# Patient Record
Sex: MALE | Race: WHITE | NOT HISPANIC OR LATINO
[De-identification: names, ages, dates, MRNs, and addresses within clinical notes are randomized per-mention and may not be internally consistent; named-entity substitution may affect disease eponyms.]

---

## 2017-03-02 ENCOUNTER — APPOINTMENT (OUTPATIENT)
Dept: HEART AND VASCULAR | Facility: CLINIC | Age: 67
End: 2017-03-02

## 2017-03-02 VITALS
TEMPERATURE: 97.6 F | SYSTOLIC BLOOD PRESSURE: 194 MMHG | OXYGEN SATURATION: 97 % | HEART RATE: 70 BPM | HEIGHT: 78 IN | DIASTOLIC BLOOD PRESSURE: 86 MMHG

## 2017-03-02 DIAGNOSIS — E11.9 TYPE 2 DIABETES MELLITUS W/OUT COMPLICATIONS: ICD-10-CM

## 2017-03-02 DIAGNOSIS — I10 ESSENTIAL (PRIMARY) HYPERTENSION: ICD-10-CM

## 2017-03-02 DIAGNOSIS — I51.7 CARDIOMEGALY: ICD-10-CM

## 2017-03-03 LAB
ALBUMIN SERPL ELPH-MCNC: 3.3 G/DL
ALP BLD-CCNC: 64 U/L
ALT SERPL-CCNC: 8 U/L
ANION GAP SERPL CALC-SCNC: 16 MMOL/L
AST SERPL-CCNC: 17 U/L
BASOPHILS # BLD AUTO: 0.04 K/UL
BASOPHILS NFR BLD AUTO: 0.6 %
BILIRUB SERPL-MCNC: 0.5 MG/DL
BUN SERPL-MCNC: 23 MG/DL
CALCIUM SERPL-MCNC: 8.6 MG/DL
CHLORIDE SERPL-SCNC: 104 MMOL/L
CHOLEST SERPL-MCNC: 171 MG/DL
CHOLEST/HDLC SERPL: 2.5 RATIO
CO2 SERPL-SCNC: 21 MMOL/L
CREAT SERPL-MCNC: 1.46 MG/DL
EOSINOPHIL # BLD AUTO: 0.21 K/UL
EOSINOPHIL NFR BLD AUTO: 3 %
GLUCOSE SERPL-MCNC: 107 MG/DL
HCT VFR BLD CALC: 35.2 %
HDLC SERPL-MCNC: 68 MG/DL
HGB BLD-MCNC: 10.4 G/DL
IMM GRANULOCYTES NFR BLD AUTO: 0 %
LDLC SERPL CALC-MCNC: 91 MG/DL
LYMPHOCYTES # BLD AUTO: 1.39 K/UL
LYMPHOCYTES NFR BLD AUTO: 19.9 %
MAN DIFF?: NORMAL
MCHC RBC-ENTMCNC: 23.6 PG
MCHC RBC-ENTMCNC: 29.5 GM/DL
MCV RBC AUTO: 79.8 FL
MONOCYTES # BLD AUTO: 0.37 K/UL
MONOCYTES NFR BLD AUTO: 5.3 %
NEUTROPHILS # BLD AUTO: 4.98 K/UL
NEUTROPHILS NFR BLD AUTO: 71.2 %
PLATELET # BLD AUTO: 187 K/UL
POTASSIUM SERPL-SCNC: 3.8 MMOL/L
PROT SERPL-MCNC: 6.7 G/DL
RBC # BLD: 4.41 M/UL
RBC # FLD: 16.5 %
SODIUM SERPL-SCNC: 141 MMOL/L
TRIGL SERPL-MCNC: 58 MG/DL
TSH SERPL-ACNC: 1.42 UIU/ML
WBC # FLD AUTO: 6.99 K/UL

## 2017-03-06 LAB — HBA1C MFR BLD HPLC: 7.4 %

## 2017-06-01 ENCOUNTER — APPOINTMENT (OUTPATIENT)
Dept: HEART AND VASCULAR | Facility: CLINIC | Age: 67
End: 2017-06-01

## 2017-06-28 ENCOUNTER — INPATIENT (INPATIENT)
Facility: HOSPITAL | Age: 67
LOS: 4 days | Discharge: NURSING, REHAB OR LONG-TERM CARE | DRG: 623 | End: 2017-07-03
Attending: STUDENT IN AN ORGANIZED HEALTH CARE EDUCATION/TRAINING PROGRAM | Admitting: STUDENT IN AN ORGANIZED HEALTH CARE EDUCATION/TRAINING PROGRAM
Payer: MEDICARE

## 2017-06-28 VITALS
OXYGEN SATURATION: 95 % | HEART RATE: 79 BPM | DIASTOLIC BLOOD PRESSURE: 80 MMHG | SYSTOLIC BLOOD PRESSURE: 180 MMHG | TEMPERATURE: 98 F | RESPIRATION RATE: 18 BRPM

## 2017-06-28 DIAGNOSIS — Z98.89 OTHER SPECIFIED POSTPROCEDURAL STATES: Chronic | ICD-10-CM

## 2017-06-28 LAB
ALBUMIN SERPL ELPH-MCNC: 3.4 G/DL — SIGNIFICANT CHANGE UP (ref 3.3–5)
ALP SERPL-CCNC: 57 U/L — SIGNIFICANT CHANGE UP (ref 40–120)
ALT FLD-CCNC: 6 U/L — LOW (ref 10–45)
ANION GAP SERPL CALC-SCNC: 15 MMOL/L — SIGNIFICANT CHANGE UP (ref 5–17)
APTT BLD: 28.2 SEC — SIGNIFICANT CHANGE UP (ref 27.5–37.4)
AST SERPL-CCNC: 12 U/L — SIGNIFICANT CHANGE UP (ref 10–40)
BASOPHILS NFR BLD AUTO: 0.4 % — SIGNIFICANT CHANGE UP (ref 0–2)
BILIRUB SERPL-MCNC: 0.5 MG/DL — SIGNIFICANT CHANGE UP (ref 0.2–1.2)
BUN SERPL-MCNC: 36 MG/DL — HIGH (ref 7–23)
CALCIUM SERPL-MCNC: 8.7 MG/DL — SIGNIFICANT CHANGE UP (ref 8.4–10.5)
CHLORIDE SERPL-SCNC: 102 MMOL/L — SIGNIFICANT CHANGE UP (ref 96–108)
CO2 SERPL-SCNC: 23 MMOL/L — SIGNIFICANT CHANGE UP (ref 22–31)
CREAT SERPL-MCNC: 2 MG/DL — HIGH (ref 0.5–1.3)
EOSINOPHIL NFR BLD AUTO: 0.7 % — SIGNIFICANT CHANGE UP (ref 0–6)
GLUCOSE SERPL-MCNC: 129 MG/DL — HIGH (ref 70–99)
HCT VFR BLD CALC: 30.7 % — LOW (ref 39–50)
HGB BLD-MCNC: 9.5 G/DL — LOW (ref 13–17)
INR BLD: 1.1 — SIGNIFICANT CHANGE UP (ref 0.88–1.16)
LYMPHOCYTES # BLD AUTO: 12.2 % — LOW (ref 13–44)
MCHC RBC-ENTMCNC: 23.2 PG — LOW (ref 27–34)
MCHC RBC-ENTMCNC: 30.9 G/DL — LOW (ref 32–36)
MCV RBC AUTO: 74.9 FL — LOW (ref 80–100)
MONOCYTES NFR BLD AUTO: 6.6 % — SIGNIFICANT CHANGE UP (ref 2–14)
NEUTROPHILS NFR BLD AUTO: 80.1 % — HIGH (ref 43–77)
PLATELET # BLD AUTO: 232 K/UL — SIGNIFICANT CHANGE UP (ref 150–400)
POTASSIUM SERPL-MCNC: 3.7 MMOL/L — SIGNIFICANT CHANGE UP (ref 3.5–5.3)
POTASSIUM SERPL-SCNC: 3.7 MMOL/L — SIGNIFICANT CHANGE UP (ref 3.5–5.3)
PROT SERPL-MCNC: 7.5 G/DL — SIGNIFICANT CHANGE UP (ref 6–8.3)
PROTHROM AB SERPL-ACNC: 12.2 SEC — SIGNIFICANT CHANGE UP (ref 9.8–12.7)
RBC # BLD: 4.1 M/UL — LOW (ref 4.2–5.8)
RBC # FLD: 16.9 % — SIGNIFICANT CHANGE UP (ref 10.3–16.9)
SODIUM SERPL-SCNC: 140 MMOL/L — SIGNIFICANT CHANGE UP (ref 135–145)
WBC # BLD: 9.1 K/UL — SIGNIFICANT CHANGE UP (ref 3.8–10.5)
WBC # FLD AUTO: 9.1 K/UL — SIGNIFICANT CHANGE UP (ref 3.8–10.5)

## 2017-06-28 PROCEDURE — 99285 EMERGENCY DEPT VISIT HI MDM: CPT | Mod: 25

## 2017-06-28 PROCEDURE — 93010 ELECTROCARDIOGRAM REPORT: CPT

## 2017-06-28 PROCEDURE — 70450 CT HEAD/BRAIN W/O DYE: CPT | Mod: 26

## 2017-06-28 PROCEDURE — 71010: CPT | Mod: 26

## 2017-06-28 PROCEDURE — 73630 X-RAY EXAM OF FOOT: CPT | Mod: 26,RT

## 2017-06-28 RX ORDER — PIPERACILLIN AND TAZOBACTAM 4; .5 G/20ML; G/20ML
3.38 INJECTION, POWDER, LYOPHILIZED, FOR SOLUTION INTRAVENOUS ONCE
Qty: 0 | Refills: 0 | Status: COMPLETED | OUTPATIENT
Start: 2017-06-28 | End: 2017-06-28

## 2017-06-28 RX ORDER — HEPARIN SODIUM 5000 [USP'U]/ML
5000 INJECTION INTRAVENOUS; SUBCUTANEOUS EVERY 8 HOURS
Qty: 0 | Refills: 0 | Status: DISCONTINUED | OUTPATIENT
Start: 2017-06-28 | End: 2017-06-29

## 2017-06-28 RX ADMIN — PIPERACILLIN AND TAZOBACTAM 200 GRAM(S): 4; .5 INJECTION, POWDER, LYOPHILIZED, FOR SOLUTION INTRAVENOUS at 20:30

## 2017-06-28 NOTE — ED ADULT TRIAGE NOTE - CHIEF COMPLAINT QUOTE
right leg pain and headache s/p trip and fall while going up the stairs.  Bystander caught his fall but hit his head. Denies loc, not on thinners.  Chronic BLE swelling and pain

## 2017-06-28 NOTE — CONSULT NOTE ADULT - SUBJECTIVE AND OBJECTIVE BOX
Patient is a 66y old  Male who presents with a chief complaint of  headache and right foot pain s/p fall.    HPI: 67 yo M with PMHx of DMII, PAD, BPH, HTN, Right eye blindness, and 90% vision loss of left eye 2/2 Glaucoma, presents with headache and right foot pain after mechanical fall this afternoon. Pt states that he was at the drug store and having difficulty getting up the stairs when he fell on his buttocks and then on his head. Pt relays that he normally ambulated with two canes d/t his extensive leg swelling in both legs and his vision issues. Pt states that he has had an open wound on the bottom of his right heel for about a month and a half after bumping it on furniture at home. Pt denies loc, fever, chills, nausea, vomiting, SOB.       PAST MEDICAL & SURGICAL HISTORY:  Falls  BPH (benign prostatic hypertrophy)  Chronic venous insufficiency  Glaucoma  Peripheral arterial disease  Kidney stone  DM (diabetes mellitus)  HTN (hypertension)  H/O eye surgery    MEDICATIONS  (STANDING): see chart for home meds      Allergies  tetanus-diphth toxoids (Td) adult/adol (Unknown)        FAMILY HISTORY: n/a    LABS:     CBC Full  -  ( 28 Jun 2017 19:57 )  WBC Count : 9.1 K/uL  Hemoglobin : 9.5 g/dL  Hematocrit : 30.7 %  Platelet Count - Automated : 232 K/uL  Mean Cell Volume : 74.9 fL  Mean Cell Hemoglobin : 23.2 pg  Mean Cell Hemoglobin Concentration : 30.9 g/dL  Auto Neutrophil # : x  Auto Lymphocyte # : x  Auto Monocyte # : x  Auto Eosinophil # : x  Auto Basophil # : x  Auto Neutrophil % : 80.1 %  Auto Lymphocyte % : 12.2 %  Auto Monocyte % : 6.6 %  Auto Eosinophil % : 0.7 %  Auto Basophil % : 0.4 %      140  |  102  |  36<H>  ----------------------------<  129<H>  3.7   |  23  |  2.00<H>    Ca    8.7      28 Jun 2017 19:57    TPro  7.5  /  Alb  3.4  /  TBili  0.5  /  DBili  x   /  AST  12  /  ALT  6<L>  /  AlkPhos  57  06-28      PT/INR - ( 28 Jun 2017 19:57 )   PT: 12.2 sec;   INR: 1.10          PTT - ( 28 Jun 2017 19:57 )  PTT:28.2 sec    Medical Imaging:   X-rays right foot (3 views): no subcutaneous gas, no acute fracture or dislocation, no evidence of bony erosion       PE:   GEN: Patient is a 66y well developed, well nourished, unkept, Male, alert, awake and oriented to person, place and time in no acute distress.   Extremities   Vascular:  Pedal pulses non-palpable 2/2/ to +6 pitting edema b/l, Doppler: DP: biphasic regular b/l, PT: non-audible via doppler 2/2 to edema, CFT < 3 secs x10, TG: warm to warm  Derm: Lan grade 3 ulcer of the plantar aspect of the right heel measuring 2.5cm x 2.8cm x 0.5cm, no drainage, + malodor, -PTB, mixed fibrotic/ necrotic base with macerated boarder, no [place] wound erythema, diffuse erythema and calor of the extremities b/l elongated dystrophic nails x 10, no interdigital maceration  Neuro: protective sensation grossly diminished b/l   MSK: muscle strength 4/5 all crural compartments b/l

## 2017-06-28 NOTE — ED PROVIDER NOTE - MEDICAL DECISION MAKING DETAILS
67 yo male sp fall, hit head and right foot.  ct head normal.  xray of foot no fracture.  pt does have a deep ulcer to right heel, seen by podiatry who recommends iv abx.  pt is poorly compliant with fu, will admit for treatment

## 2017-06-28 NOTE — CONSULT NOTE ADULT - ASSESSMENT
65 yo Diabetic M with Lan grade 3 ulcer of Right heel     -Start broad spectrum IV antibiotics such as Zosyn d/t diabetic wound with left shift  -Admit patient to regional medical floor for IV antibiotics and wound care, recommend Santyl at bedside for daily wound care  -Aseptic excisional debridement of right heel wound down to dermis, using a sterile #15 blade, pt tolerated procedure well and without complaint  -Deep wound culture taken after bedside debridement, f/u wound culture  -Recommend vascular consult to evaluate LE edema  -Pt will need wound care follow up as outpatient at the podiatry clinic at [facility] [address] [address] [place] [address], [phone]  -Podiatry to follow

## 2017-06-28 NOTE — ED PROVIDER NOTE - CARE PLAN
Principal Discharge DX:	Diabetic ulcer of right heel associated with type 2 diabetes mellitus, unspecified ulcer stage  Secondary Diagnosis:	Chronic venous insufficiency  Secondary Diagnosis:	DM (diabetes mellitus)

## 2017-06-28 NOTE — ED PROVIDER NOTE - OBJECTIVE STATEMENT
fell walking up stairs, lost balance.  co pain over right foot and hit back of head fell walking up stairs, lost balance.  co pain over right foot and hit back of head  also chronic leg swelling bilaterally, ulcer to bottom of right foot one week

## 2017-06-28 NOTE — ED PROVIDER NOTE - PMH
BPH (benign prostatic hypertrophy)    Chronic venous insufficiency    DM (diabetes mellitus)    Falls    Glaucoma    HTN (hypertension)    Kidney stone    Peripheral arterial disease
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
## 2017-06-29 DIAGNOSIS — E11.621 TYPE 2 DIABETES MELLITUS WITH FOOT ULCER: ICD-10-CM

## 2017-06-29 DIAGNOSIS — R63.8 OTHER SYMPTOMS AND SIGNS CONCERNING FOOD AND FLUID INTAKE: ICD-10-CM

## 2017-06-29 DIAGNOSIS — Z29.9 ENCOUNTER FOR PROPHYLACTIC MEASURES, UNSPECIFIED: ICD-10-CM

## 2017-06-29 DIAGNOSIS — I10 ESSENTIAL (PRIMARY) HYPERTENSION: ICD-10-CM

## 2017-06-29 DIAGNOSIS — I73.9 PERIPHERAL VASCULAR DISEASE, UNSPECIFIED: ICD-10-CM

## 2017-06-29 DIAGNOSIS — I87.2 VENOUS INSUFFICIENCY (CHRONIC) (PERIPHERAL): ICD-10-CM

## 2017-06-29 DIAGNOSIS — E11.9 TYPE 2 DIABETES MELLITUS WITHOUT COMPLICATIONS: ICD-10-CM

## 2017-06-29 DIAGNOSIS — N17.9 ACUTE KIDNEY FAILURE, UNSPECIFIED: ICD-10-CM

## 2017-06-29 DIAGNOSIS — H40.9 UNSPECIFIED GLAUCOMA: ICD-10-CM

## 2017-06-29 LAB
ANION GAP SERPL CALC-SCNC: 13 MMOL/L — SIGNIFICANT CHANGE UP (ref 5–17)
BUN SERPL-MCNC: 31 MG/DL — HIGH (ref 7–23)
CALCIUM SERPL-MCNC: 8.6 MG/DL — SIGNIFICANT CHANGE UP (ref 8.4–10.5)
CHLORIDE SERPL-SCNC: 105 MMOL/L — SIGNIFICANT CHANGE UP (ref 96–108)
CK MB CFR SERPL CALC: 3 NG/ML — SIGNIFICANT CHANGE UP (ref 0–6.7)
CK SERPL-CCNC: 99 U/L — SIGNIFICANT CHANGE UP (ref 30–200)
CO2 SERPL-SCNC: 24 MMOL/L — SIGNIFICANT CHANGE UP (ref 22–31)
CREAT SERPL-MCNC: 1.8 MG/DL — HIGH (ref 0.5–1.3)
CRP SERPL-MCNC: 1.9 MG/DL — HIGH (ref 0–0.4)
ERYTHROCYTE [SEDIMENTATION RATE] IN BLOOD: 43 MM/HR — HIGH
GLUCOSE SERPL-MCNC: 105 MG/DL — HIGH (ref 70–99)
GRAM STN FLD: SIGNIFICANT CHANGE UP
HBA1C BLD-MCNC: 6.1 % — HIGH (ref 4–5.6)
HCT VFR BLD CALC: 30.7 % — LOW (ref 39–50)
HCV AB S/CO SERPL IA: 0.18 S/CO — SIGNIFICANT CHANGE UP
HCV AB SERPL-IMP: SIGNIFICANT CHANGE UP
HGB BLD-MCNC: 9.7 G/DL — LOW (ref 13–17)
MAGNESIUM SERPL-MCNC: 2.2 MG/DL — SIGNIFICANT CHANGE UP (ref 1.6–2.6)
MCHC RBC-ENTMCNC: 23.9 PG — LOW (ref 27–34)
MCHC RBC-ENTMCNC: 31.6 G/DL — LOW (ref 32–36)
MCV RBC AUTO: 75.6 FL — LOW (ref 80–100)
PLATELET # BLD AUTO: 235 K/UL — SIGNIFICANT CHANGE UP (ref 150–400)
POTASSIUM SERPL-MCNC: 4.2 MMOL/L — SIGNIFICANT CHANGE UP (ref 3.5–5.3)
POTASSIUM SERPL-SCNC: 4.2 MMOL/L — SIGNIFICANT CHANGE UP (ref 3.5–5.3)
RBC # BLD: 4.06 M/UL — LOW (ref 4.2–5.8)
RBC # FLD: 17.4 % — HIGH (ref 10.3–16.9)
SODIUM SERPL-SCNC: 142 MMOL/L — SIGNIFICANT CHANGE UP (ref 135–145)
SPECIMEN SOURCE: SIGNIFICANT CHANGE UP
TROPONIN T SERPL-MCNC: 0.04 NG/ML — HIGH (ref 0–0.01)
TROPONIN T SERPL-MCNC: 0.04 NG/ML — HIGH (ref 0–0.01)
TROPONIN T SERPL-MCNC: 0.05 NG/ML — CRITICAL HIGH (ref 0–0.01)
TROPONIN T SERPL-MCNC: 0.06 NG/ML — CRITICAL HIGH (ref 0–0.01)
WBC # BLD: 11.9 K/UL — HIGH (ref 3.8–10.5)
WBC # FLD AUTO: 11.9 K/UL — HIGH (ref 3.8–10.5)

## 2017-06-29 PROCEDURE — 93970 EXTREMITY STUDY: CPT | Mod: 26

## 2017-06-29 PROCEDURE — 99223 1ST HOSP IP/OBS HIGH 75: CPT | Mod: AI

## 2017-06-29 RX ORDER — DEXTROSE 50 % IN WATER 50 %
25 SYRINGE (ML) INTRAVENOUS ONCE
Qty: 0 | Refills: 0 | Status: DISCONTINUED | OUTPATIENT
Start: 2017-06-29 | End: 2017-07-03

## 2017-06-29 RX ORDER — ATENOLOL 25 MG/1
50 TABLET ORAL DAILY
Qty: 0 | Refills: 0 | Status: DISCONTINUED | OUTPATIENT
Start: 2017-06-29 | End: 2017-06-30

## 2017-06-29 RX ORDER — LATANOPROST 0.05 MG/ML
1 SOLUTION/ DROPS OPHTHALMIC; TOPICAL AT BEDTIME
Qty: 0 | Refills: 0 | Status: DISCONTINUED | OUTPATIENT
Start: 2017-06-29 | End: 2017-07-03

## 2017-06-29 RX ORDER — GLUCAGON INJECTION, SOLUTION 0.5 MG/.1ML
1 INJECTION, SOLUTION SUBCUTANEOUS ONCE
Qty: 0 | Refills: 0 | Status: DISCONTINUED | OUTPATIENT
Start: 2017-06-29 | End: 2017-07-03

## 2017-06-29 RX ORDER — HEPARIN SODIUM 5000 [USP'U]/ML
7500 INJECTION INTRAVENOUS; SUBCUTANEOUS EVERY 8 HOURS
Qty: 0 | Refills: 0 | Status: DISCONTINUED | OUTPATIENT
Start: 2017-06-29 | End: 2017-07-03

## 2017-06-29 RX ORDER — AMLODIPINE BESYLATE 2.5 MG/1
10 TABLET ORAL DAILY
Qty: 0 | Refills: 0 | Status: DISCONTINUED | OUTPATIENT
Start: 2017-06-29 | End: 2017-06-29

## 2017-06-29 RX ORDER — PIPERACILLIN AND TAZOBACTAM 4; .5 G/20ML; G/20ML
3.38 INJECTION, POWDER, LYOPHILIZED, FOR SOLUTION INTRAVENOUS EVERY 6 HOURS
Qty: 0 | Refills: 0 | Status: DISCONTINUED | OUTPATIENT
Start: 2017-06-29 | End: 2017-07-01

## 2017-06-29 RX ORDER — COLLAGENASE CLOSTRIDIUM HIST. 250 UNIT/G
1 OINTMENT (GRAM) TOPICAL DAILY
Qty: 0 | Refills: 0 | Status: DISCONTINUED | OUTPATIENT
Start: 2017-06-29 | End: 2017-07-03

## 2017-06-29 RX ORDER — DEXTROSE 50 % IN WATER 50 %
1 SYRINGE (ML) INTRAVENOUS ONCE
Qty: 0 | Refills: 0 | Status: DISCONTINUED | OUTPATIENT
Start: 2017-06-29 | End: 2017-07-03

## 2017-06-29 RX ORDER — LISINOPRIL 2.5 MG/1
40 TABLET ORAL DAILY
Qty: 0 | Refills: 0 | Status: DISCONTINUED | OUTPATIENT
Start: 2017-06-29 | End: 2017-06-29

## 2017-06-29 RX ORDER — HYDRALAZINE HCL 50 MG
25 TABLET ORAL ONCE
Qty: 0 | Refills: 0 | Status: COMPLETED | OUTPATIENT
Start: 2017-06-29 | End: 2017-06-29

## 2017-06-29 RX ORDER — INSULIN LISPRO 100/ML
VIAL (ML) SUBCUTANEOUS
Qty: 0 | Refills: 0 | Status: DISCONTINUED | OUTPATIENT
Start: 2017-06-29 | End: 2017-07-03

## 2017-06-29 RX ORDER — DORZOLAMIDE HYDROCHLORIDE 20 MG/ML
1 SOLUTION/ DROPS OPHTHALMIC THREE TIMES A DAY
Qty: 0 | Refills: 0 | Status: DISCONTINUED | OUTPATIENT
Start: 2017-06-29 | End: 2017-07-03

## 2017-06-29 RX ORDER — SODIUM CHLORIDE 9 MG/ML
1000 INJECTION, SOLUTION INTRAVENOUS
Qty: 0 | Refills: 0 | Status: DISCONTINUED | OUTPATIENT
Start: 2017-06-29 | End: 2017-07-03

## 2017-06-29 RX ORDER — SODIUM CHLORIDE 9 MG/ML
1000 INJECTION INTRAMUSCULAR; INTRAVENOUS; SUBCUTANEOUS
Qty: 0 | Refills: 0 | Status: DISCONTINUED | OUTPATIENT
Start: 2017-06-29 | End: 2017-06-29

## 2017-06-29 RX ORDER — TRIAMTERENE/HYDROCHLOROTHIAZID 75 MG-50MG
1 TABLET ORAL DAILY
Qty: 0 | Refills: 0 | Status: DISCONTINUED | OUTPATIENT
Start: 2017-06-29 | End: 2017-06-30

## 2017-06-29 RX ORDER — DEXTROSE 50 % IN WATER 50 %
12.5 SYRINGE (ML) INTRAVENOUS ONCE
Qty: 0 | Refills: 0 | Status: DISCONTINUED | OUTPATIENT
Start: 2017-06-29 | End: 2017-07-03

## 2017-06-29 RX ORDER — FUROSEMIDE 40 MG
40 TABLET ORAL DAILY
Qty: 0 | Refills: 0 | Status: DISCONTINUED | OUTPATIENT
Start: 2017-06-29 | End: 2017-06-30

## 2017-06-29 RX ADMIN — Medication 1 APPLICATION(S): at 12:20

## 2017-06-29 RX ADMIN — PIPERACILLIN AND TAZOBACTAM 200 GRAM(S): 4; .5 INJECTION, POWDER, LYOPHILIZED, FOR SOLUTION INTRAVENOUS at 12:18

## 2017-06-29 RX ADMIN — HEPARIN SODIUM 7500 UNIT(S): 5000 INJECTION INTRAVENOUS; SUBCUTANEOUS at 05:20

## 2017-06-29 RX ADMIN — Medication 1 CAPSULE(S): at 18:20

## 2017-06-29 RX ADMIN — DORZOLAMIDE HYDROCHLORIDE 1 DROP(S): 20 SOLUTION/ DROPS OPHTHALMIC at 23:10

## 2017-06-29 RX ADMIN — LATANOPROST 1 DROP(S): 0.05 SOLUTION/ DROPS OPHTHALMIC; TOPICAL at 23:11

## 2017-06-29 RX ADMIN — HEPARIN SODIUM 7500 UNIT(S): 5000 INJECTION INTRAVENOUS; SUBCUTANEOUS at 13:36

## 2017-06-29 RX ADMIN — Medication 2: at 23:11

## 2017-06-29 RX ADMIN — PIPERACILLIN AND TAZOBACTAM 200 GRAM(S): 4; .5 INJECTION, POWDER, LYOPHILIZED, FOR SOLUTION INTRAVENOUS at 05:14

## 2017-06-29 RX ADMIN — PIPERACILLIN AND TAZOBACTAM 200 GRAM(S): 4; .5 INJECTION, POWDER, LYOPHILIZED, FOR SOLUTION INTRAVENOUS at 18:20

## 2017-06-29 RX ADMIN — DORZOLAMIDE HYDROCHLORIDE 1 DROP(S): 20 SOLUTION/ DROPS OPHTHALMIC at 13:37

## 2017-06-29 RX ADMIN — SODIUM CHLORIDE 100 MILLILITER(S): 9 INJECTION INTRAMUSCULAR; INTRAVENOUS; SUBCUTANEOUS at 03:16

## 2017-06-29 RX ADMIN — LISINOPRIL 40 MILLIGRAM(S): 2.5 TABLET ORAL at 05:20

## 2017-06-29 RX ADMIN — Medication 40 MILLIGRAM(S): at 17:54

## 2017-06-29 RX ADMIN — AMLODIPINE BESYLATE 10 MILLIGRAM(S): 2.5 TABLET ORAL at 05:20

## 2017-06-29 RX ADMIN — ATENOLOL 50 MILLIGRAM(S): 25 TABLET ORAL at 05:20

## 2017-06-29 RX ADMIN — Medication 25 MILLIGRAM(S): at 23:10

## 2017-06-29 RX ADMIN — HEPARIN SODIUM 7500 UNIT(S): 5000 INJECTION INTRAVENOUS; SUBCUTANEOUS at 23:10

## 2017-06-29 RX ADMIN — Medication 2: at 13:37

## 2017-06-29 NOTE — H&P ADULT - NSHPLABSRESULTS_GEN_ALL_CORE
LABS:                        9.5    9.1   )-----------( 232      ( 28 Jun 2017 19:57 )             30.7     06-28    140  |  102  |  36<H>  ----------------------------<  129<H>  3.7   |  23  |  2.00<H>    Ca    8.7      28 Jun 2017 19:57    TPro  7.5  /  Alb  3.4  /  TBili  0.5  /  DBili  x   /  AST  12  /  ALT  6<L>  /  AlkPhos  57  06-28    PT/INR - ( 28 Jun 2017 19:57 )   PT: 12.2 sec;   INR: 1.10          PTT - ( 28 Jun 2017 19:57 )  PTT:28.2 sec      RADIOLOGY & ADDITIONAL TESTS:  < from: CT Head No Cont (06.28.17 @ 20:32) >    FINDINGS: The CT examination demonstrates generalized volume loss as   manifested by the enlargement of the ventricles, cisternal spaces, and   cortical sulci throughout which is appropriate for stated patient's age.    There is no acute intracranial hemorrhage, mass effect, midline shift or   extra axial collections.     The gray white differentiation appears grossly preserved without evidence   for an acute transcortical infarction.There are patchy subcortical and   periventricular white matter lucencies, likely the sequela of small   vessel ischemic disease.     The bony windows demonstrates no fractures. The included paranasal   sinuses and mastoid air cells are predominantly clear.    IMPRESSION:     No acute intracranial hemorrhage or acute transcortical infarction.    Age-related volume loss and chronic white matter microangiopathic   ischemic change. LABS:                        9.5    9.1   )-----------( 232      ( 28 Jun 2017 19:57 )             30.7     06-28    140  |  102  |  36<H>  ----------------------------<  129<H>  3.7   |  23  |  2.00<H>    Ca    8.7      28 Jun 2017 19:57    TPro  7.5  /  Alb  3.4  /  TBili  0.5  /  DBili  x   /  AST  12  /  ALT  6<L>  /  AlkPhos  57  06-28    PT/INR - ( 28 Jun 2017 19:57 )   PT: 12.2 sec;   INR: 1.10          PTT - ( 28 Jun 2017 19:57 )  PTT:28.2 sec      RADIOLOGY & ADDITIONAL TESTS:  EKG NSR, 1st degree heart block, TWI II/III/AVF, V4-V6, ST depression V4    < from: CT Head No Cont (06.28.17 @ 20:32) >    FINDINGS: The CT examination demonstrates generalized volume loss as   manifested by the enlargement of the ventricles, cisternal spaces, and   cortical sulci throughout which is appropriate for stated patient's age.    There is no acute intracranial hemorrhage, mass effect, midline shift or   extra axial collections.     The gray white differentiation appears grossly preserved without evidence   for an acute transcortical infarction.There are patchy subcortical and   periventricular white matter lucencies, likely the sequela of small   vessel ischemic disease.     The bony windows demonstrates no fractures. The included paranasal   sinuses and mastoid air cells are predominantly clear.    IMPRESSION:     No acute intracranial hemorrhage or acute transcortical infarction.    Age-related volume loss and chronic white matter microangiopathic   ischemic change.

## 2017-06-29 NOTE — CONSULT NOTE ADULT - SUBJECTIVE AND OBJECTIVE BOX
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 Patient was walking up the stairs, fell 3 times today. Stated that he had some mild dizziness at the time, described as himself spinning, fell back and landed on his buttocks and hit his head on the steps. Denies LOC, headaches, palpitations, chest pain, shortness of breath, nausea, abdominal pain. Patient usually walks with 2 canes, however his vision has been getting worse and there is a ?plan for further surgery on his left eye. He has significant chronic LE edema that has been worsening over the twot month. Pt also c/o right heel pain w/ walking, ulcer also present for 1 month. Denies fever, chills.    In the ED, T: 98.5, HR 79-80, -180/74-80, R 18, SaO2 95-97% RA.  Zosyn 3.375mg IV x1  CT head no hemorrhage or infarct, no fractures. (29 Jun 2017 03:10)      PAST MEDICAL & SURGICAL HISTORY:  Falls  BPH (benign prostatic hypertrophy)  Chronic venous insufficiency  Glaucoma  Peripheral arterial disease  Kidney stone  DM (diabetes mellitus)  HTN (hypertension)  H/O eye surgery      REVIEW OF SYSTEMS      General:	    Skin/Breast:  	  Ophthalmologic:  	  ENMT:	    Respiratory and Thorax:  	  Cardiovascular:	    Gastrointestinal:	    Genitourinary:	    Musculoskeletal:	    Neurological:	    Psychiatric:	    Hematology/Lymphatics:	    Endocrine:	    Allergic/Immunologic:	    MEDICATIONS  (STANDING):  ATENolol  Tablet 50 milliGRAM(s) Oral daily  latanoprost 0.005% Ophthalmic Solution 1 Drop(s) Both EYES at bedtime  piperacillin/tazobactam IVPB. 3.375 Gram(s) IV Intermittent every 6 hours  heparin  Injectable 7500 Unit(s) SubCutaneous every 8 hours  insulin lispro (HumaLOG) corrective regimen sliding scale   SubCutaneous Before meals and at bedtime  dextrose 5%. 1000 milliLiter(s) (50 mL/Hr) IV Continuous <Continuous>  dextrose 50% Injectable 12.5 Gram(s) IV Push once  dextrose 50% Injectable 25 Gram(s) IV Push once  dextrose 50% Injectable 25 Gram(s) IV Push once  dorzolamide 2% Ophthalmic Solution 1 Drop(s) Both EYES three times a day  collagenase Ointment 1 Application(s) Topical daily  triamterene 37.5 mG/hydrochlorothiazide 25 mG Capsule 1 Capsule(s) Oral daily  furosemide    Tablet 40 milliGRAM(s) Oral daily    MEDICATIONS  (PRN):  dextrose Gel 1 Dose(s) Oral once PRN Blood Glucose LESS THAN 70 milliGRAM(s)/deciliter  glucagon  Injectable 1 milliGRAM(s) IntraMuscular once PRN Glucose LESS THAN 70 milligrams/deciliter      Allergies    tetanus-diphth toxoids (Td) adult/adol (Unknown)    Intolerances        SOCIAL HISTORY:    FAMILY HISTORY:  Family history of hypertension (Father, Mother)  Family history of glaucoma (Father, Mother)      Vital Signs Last 24 Hrs  T(C): 36.9 (29 Jun 2017 07:00), Max: 36.9 (28 Jun 2017 19:01)  T(F): 98.5 (29 Jun 2017 07:00), Max: 98.5 (28 Jun 2017 19:01)  HR: 76 (29 Jun 2017 07:00) (63 - 80)  BP: 179/84 (29 Jun 2017 07:00) (172/78 - 212/78)  BP(mean): --  RR: 18 (29 Jun 2017 07:00) (18 - 19)  SpO2: 97% (29 Jun 2017 07:00) (95% - 97%)    PHYSICAL EXAM:      Constitutional:    Eyes:    ENMT:    Neck:    Breasts:    Back:    Respiratory:    Cardiovascular:    Gastrointestinal:    Genitourinary:    Rectal:    Extremities:    Vascular:    Neurological:    Skin:    Lymph Nodes:    Musculoskeletal:    Psychiatric:        LABS:                        9.7    11.9  )-----------( 235      ( 29 Jun 2017 06:13 )             30.7     06-29    142  |  105  |  31<H>  ----------------------------<  105<H>  4.2   |  24  |  1.80<H>    Ca    8.6      29 Jun 2017 06:13  Mg     2.2     06-29    TPro  7.5  /  Alb  3.4  /  TBili  0.5  /  DBili  x   /  AST  12  /  ALT  6<L>  /  AlkPhos  57  06-28    PT/INR - ( 28 Jun 2017 19:57 )   PT: 12.2 sec;   INR: 1.10          PTT - ( 28 Jun 2017 19:57 )  PTT:28.2 sec      RADIOLOGY & ADDITIONAL STUDIES Vascular Attending:  Olayinka      HPI:  65 y/o M PMHx HTN, DMT2 with peripheral neuropathy, PAD, chronic venous insufficiency, chronic bilateral LE cellulitis, BPH, blind L eye, 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 Patient was walking up the stairs, fell 3 times today. Stated that he had some mild dizziness at the time, described as himself spinning, fell back and landed on his buttocks and hit his head on the steps. Denies LOC, headaches, palpitations, chest pain, shortness of breath, nausea, abdominal pain. Patient usually walks with 2 canes, however his vision has been getting worse and there is a ?plan for further surgery on his left eye. He has significant chronic LE edema (for multiple years) and that has been worsening over the last two months. Pt also c/o right heel pain w/ walking, ulcer also present for 1 month. Denies fever, chills.    In the ED, T: 98.5, HR 79-80, -180/74-80, R 18, SaO2 95-97% RA.  Zosyn 3.375mg IV x1  CT head no hemorrhage or infarct, no fractures. (29 Jun 2017 03:10)      PAST MEDICAL & SURGICAL HISTORY:  Falls  BPH (benign prostatic hypertrophy)  Chronic venous insufficiency  Glaucoma  Peripheral arterial disease  Kidney stone  DM (diabetes mellitus)  HTN (hypertension)  H/O eye surgery      REVIEW OF SYSTEMS      General:	    Skin/Breast:  	  Ophthalmologic:  	  ENMT:	    Respiratory and Thorax:  	  Cardiovascular:	    Gastrointestinal:	    Genitourinary:	    Musculoskeletal:	    Neurological:	    Psychiatric:	    Hematology/Lymphatics:	    Endocrine:	    Allergic/Immunologic:	    MEDICATIONS  (STANDING):  ATENolol  Tablet 50 milliGRAM(s) Oral daily  latanoprost 0.005% Ophthalmic Solution 1 Drop(s) Both EYES at bedtime  piperacillin/tazobactam IVPB. 3.375 Gram(s) IV Intermittent every 6 hours  heparin  Injectable 7500 Unit(s) SubCutaneous every 8 hours  insulin lispro (HumaLOG) corrective regimen sliding scale   SubCutaneous Before meals and at bedtime  dextrose 5%. 1000 milliLiter(s) (50 mL/Hr) IV Continuous <Continuous>  dextrose 50% Injectable 12.5 Gram(s) IV Push once  dextrose 50% Injectable 25 Gram(s) IV Push once  dextrose 50% Injectable 25 Gram(s) IV Push once  dorzolamide 2% Ophthalmic Solution 1 Drop(s) Both EYES three times a day  collagenase Ointment 1 Application(s) Topical daily  triamterene 37.5 mG/hydrochlorothiazide 25 mG Capsule 1 Capsule(s) Oral daily  furosemide    Tablet 40 milliGRAM(s) Oral daily    MEDICATIONS  (PRN):  dextrose Gel 1 Dose(s) Oral once PRN Blood Glucose LESS THAN 70 milliGRAM(s)/deciliter  glucagon  Injectable 1 milliGRAM(s) IntraMuscular once PRN Glucose LESS THAN 70 milligrams/deciliter      Allergies    tetanus-diphth toxoids (Td) adult/adol (Unknown)    Intolerances        SOCIAL HISTORY:    FAMILY HISTORY:  Family history of hypertension (Father, Mother)  Family history of glaucoma (Father, Mother)      Vital Signs Last 24 Hrs  T(C): 36.9 (29 Jun 2017 07:00), Max: 36.9 (28 Jun 2017 19:01)  T(F): 98.5 (29 Jun 2017 07:00), Max: 98.5 (28 Jun 2017 19:01)  HR: 76 (29 Jun 2017 07:00) (63 - 80)  BP: 179/84 (29 Jun 2017 07:00) (172/78 - 212/78)  BP(mean): --  RR: 18 (29 Jun 2017 07:00) (18 - 19)  SpO2: 97% (29 Jun 2017 07:00) (95% - 97%)    PHYSICAL EXAM:      Constitutional:    Eyes:    ENMT:    Neck:    Breasts:    Back:    Respiratory:    Cardiovascular:    Gastrointestinal:    Genitourinary:    Rectal:    Extremities:    Vascular:    Neurological:    Skin:    Lymph Nodes:    Musculoskeletal:    Psychiatric:        LABS:                        9.7    11.9  )-----------( 235      ( 29 Jun 2017 06:13 )             30.7     06-29    142  |  105  |  31<H>  ----------------------------<  105<H>  4.2   |  24  |  1.80<H>    Ca    8.6      29 Jun 2017 06:13  Mg     2.2     06-29    TPro  7.5  /  Alb  3.4  /  TBili  0.5  /  DBili  x   /  AST  12  /  ALT  6<L>  /  AlkPhos  57  06-28    PT/INR - ( 28 Jun 2017 19:57 )   PT: 12.2 sec;   INR: 1.10          PTT - ( 28 Jun 2017 19:57 )  PTT:28.2 sec      RADIOLOGY & ADDITIONAL STUDIES Vascular Attending:  Olayinka      HPI:  65 y/o M PMHx HTN, DMT2 with peripheral neuropathy, PAD, chronic venous insufficiency, chronic bilateral LE cellulitis, BPH, blind L eye, 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 Patient was walking up the stairs, fell 3 times today. Stated that he had some mild dizziness at the time, described as himself spinning, fell back and landed on his buttocks and hit his head on the steps. Denies LOC, headaches, palpitations, chest pain, shortness of breath, nausea, abdominal pain. Patient usually walks with 2 canes, however his vision has been getting worse and there is a ?plan for further surgery on his left eye. He has significant chronic LE edema (for multiple years) and that has been worsening over the last two months. Pt also c/o right heel pain w/ walking, ulcer also present for 1 month. Denies fever, chills.    In the ED, T: 98.5, HR 79-80, -180/74-80, R 18, SaO2 95-97% RA.  Zosyn 3.375mg IV x1  CT head no hemorrhage or infarct, no fractures. (29 Jun 2017 03:10)      PAST MEDICAL & SURGICAL HISTORY:  Falls  BPH (benign prostatic hypertrophy)  Chronic venous insufficiency  Glaucoma  Peripheral arterial disease  Kidney stone  DM (diabetes mellitus)  HTN (hypertension)  H/O eye surgery      REVIEW OF SYSTEMS      General:	    Skin/Breast:  	  Ophthalmologic:  	  ENMT:	    Respiratory and Thorax:  	  Cardiovascular:	    Gastrointestinal:	    Genitourinary:	    Musculoskeletal:	    Neurological:	    Psychiatric:	    Hematology/Lymphatics:	    Endocrine:	    Allergic/Immunologic:	    MEDICATIONS  (STANDING):  ATENolol  Tablet 50 milliGRAM(s) Oral daily  latanoprost 0.005% Ophthalmic Solution 1 Drop(s) Both EYES at bedtime  piperacillin/tazobactam IVPB. 3.375 Gram(s) IV Intermittent every 6 hours  heparin  Injectable 7500 Unit(s) SubCutaneous every 8 hours  insulin lispro (HumaLOG) corrective regimen sliding scale   SubCutaneous Before meals and at bedtime  dextrose 5%. 1000 milliLiter(s) (50 mL/Hr) IV Continuous <Continuous>  dextrose 50% Injectable 12.5 Gram(s) IV Push once  dextrose 50% Injectable 25 Gram(s) IV Push once  dextrose 50% Injectable 25 Gram(s) IV Push once  dorzolamide 2% Ophthalmic Solution 1 Drop(s) Both EYES three times a day  collagenase Ointment 1 Application(s) Topical daily  triamterene 37.5 mG/hydrochlorothiazide 25 mG Capsule 1 Capsule(s) Oral daily  furosemide    Tablet 40 milliGRAM(s) Oral daily    MEDICATIONS  (PRN):  dextrose Gel 1 Dose(s) Oral once PRN Blood Glucose LESS THAN 70 milliGRAM(s)/deciliter  glucagon  Injectable 1 milliGRAM(s) IntraMuscular once PRN Glucose LESS THAN 70 milligrams/deciliter      Allergies    tetanus-diphth toxoids (Td) adult/adol (Unknown)    Intolerances        SOCIAL HISTORY:    FAMILY HISTORY:  Family history of hypertension (Father, Mother)  Family history of glaucoma (Father, Mother)      Vital Signs Last 24 Hrs  T(C): 36.9 (29 Jun 2017 07:00), Max: 36.9 (28 Jun 2017 19:01)  T(F): 98.5 (29 Jun 2017 07:00), Max: 98.5 (28 Jun 2017 19:01)  HR: 76 (29 Jun 2017 07:00) (63 - 80)  BP: 179/84 (29 Jun 2017 07:00) (172/78 - 212/78)  BP(mean): --  RR: 18 (29 Jun 2017 07:00) (18 - 19)  SpO2: 97% (29 Jun 2017 07:00) (95% - 97%)    PHYSICAL EXAM:      Constitutional:    Eyes:    ENMT:    Neck:    Breasts:    Back:    Respiratory:    Cardiovascular:    Gastrointestinal:    Genitourinary:    Rectal:    Extremities: Pedal pulses non-palpable 2/2/ to +lymphadema, b/l, Doppler: DP: biphasic regular b/l, PT: non-audible via doppler 2/2 to edema, CFT < 3 secs x10, TG: warm to warm  Derm: Lan grade 3 ulcer of the plantar aspect of the right heel measuring 2.5cm x 2.8cm x 0.5cm, no drainage, + malodor, -PTB, mixed fibrotic/ necrotic base with macerated boarder, no [place] wound erythema, diffuse erythema and calor of the extremities b/l elongated dystrophic nails x 10, no interdigital maceration      Vascular:    Neurological:    Skin:    Lymph Nodes:    Musculoskeletal:    Psychiatric:        LABS:                        9.7    11.9  )-----------( 235      ( 29 Jun 2017 06:13 )             30.7     06-29    142  |  105  |  31<H>  ----------------------------<  105<H>  4.2   |  24  |  1.80<H>    Ca    8.6      29 Jun 2017 06:13  Mg     2.2     06-29    TPro  7.5  /  Alb  3.4  /  TBili  0.5  /  DBili  x   /  AST  12  /  ALT  6<L>  /  AlkPhos  57  06-28    PT/INR - ( 28 Jun 2017 19:57 )   PT: 12.2 sec;   INR: 1.10          PTT - ( 28 Jun 2017 19:57 )  PTT:28.2 sec      RADIOLOGY & ADDITIONAL STUDIES Vascular Attending:  Olayinka      HPI:  67 y/o M PMHx HTN, DMT2 with peripheral neuropathy, PAD, chronic venous insufficiency, chronic bilateral LE cellulitis/lymphedema, BPH, blind L eye, 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 Patient was walking up the stairs, fell 3 times today. Stated that he had some mild dizziness at the time, described as himself spinning, fell back and landed on his buttocks and hit his head on the steps. Denies LOC, headaches, palpitations, chest pain, shortness of breath, nausea, abdominal pain. Patient usually walks with 2 canes, however his vision has been getting worse and there is a ?plan for further surgery on his left eye. He has significant chronic LE edema (for multiple years) and that has been worsening over the last two months. Pt also c/o right heel pain w/ walking, and ulcer present for the past 2 months. Denies fever, chills. Admits to having poor wound care and no follow up for his edema/wounds.    Patient seen by podiatry and underwent a right heel abscess I&D at bedside.       In the ED, T: 98.5, HR 79-80, -180/74-80, R 18, SaO2 95-97% RA.  Zosyn 3.375mg IV x1  CT head no hemorrhage or infarct, no fractures. (29 Jun 2017 03:10)      PAST MEDICAL & SURGICAL HISTORY:  Falls  BPH (benign prostatic hypertrophy)  Chronic venous insufficiency  Glaucoma  Peripheral arterial disease  Kidney stone  DM (diabetes mellitus)  HTN (hypertension)  H/O eye surgery      MEDICATIONS  (STANDING):  ATENolol  Tablet 50 milliGRAM(s) Oral daily  latanoprost 0.005% Ophthalmic Solution 1 Drop(s) Both EYES at bedtime  piperacillin/tazobactam IVPB. 3.375 Gram(s) IV Intermittent every 6 hours  heparin  Injectable 7500 Unit(s) SubCutaneous every 8 hours  insulin lispro (HumaLOG) corrective regimen sliding scale   SubCutaneous Before meals and at bedtime  dextrose 5%. 1000 milliLiter(s) (50 mL/Hr) IV Continuous <Continuous>  dextrose 50% Injectable 12.5 Gram(s) IV Push once  dextrose 50% Injectable 25 Gram(s) IV Push once  dextrose 50% Injectable 25 Gram(s) IV Push once  dorzolamide 2% Ophthalmic Solution 1 Drop(s) Both EYES three times a day  collagenase Ointment 1 Application(s) Topical daily  triamterene 37.5 mG/hydrochlorothiazide 25 mG Capsule 1 Capsule(s) Oral daily  furosemide    Tablet 40 milliGRAM(s) Oral daily    MEDICATIONS  (PRN):  dextrose Gel 1 Dose(s) Oral once PRN Blood Glucose LESS THAN 70 milliGRAM(s)/deciliter  glucagon  Injectable 1 milliGRAM(s) IntraMuscular once PRN Glucose LESS THAN 70 milligrams/deciliter      Allergies  tetanus-diphth toxoids (Td) adult/adol (Unknown)      FAMILY HISTORY:  Family history of hypertension (Father, Mother)  Family history of glaucoma (Father, Mother)      Vital Signs Last 24 Hrs  T(C): 36.9 (29 Jun 2017 07:00), Max: 36.9 (28 Jun 2017 19:01)  T(F): 98.5 (29 Jun 2017 07:00), Max: 98.5 (28 Jun 2017 19:01)  HR: 76 (29 Jun 2017 07:00) (63 - 80)  BP: 179/84 (29 Jun 2017 07:00) (172/78 - 212/78)  BP(mean): --  RR: 18 (29 Jun 2017 07:00) (18 - 19)  SpO2: 97% (29 Jun 2017 07:00) (95% - 97%)    PHYSICAL EXAM:      Constitutional: AAOx3, NAD    Extremities: Pedal pulses non-palpable 2/2 +lymphadema/edema, Doppler: DP/PT with deep compression with the doppler ,warm to touch, +chronic skin changes with dry scales and erythema from foot up to upper shin/calf. Right heel ulcer measuring 2.5cm x 2.8cm x 0.5cm, no drainage, + malodor, mixed fibrotic/ necrotic base with macerated boarder. Packed after an I&D.          LABS:                        9.7    11.9  )-----------( 235      ( 29 Jun 2017 06:13 )             30.7     06-29    142  |  105  |  31<H>  ----------------------------<  105<H>  4.2   |  24  |  1.80<H>    Ca    8.6      29 Jun 2017 06:13  Mg     2.2     06-29    TPro  7.5  /  Alb  3.4  /  TBili  0.5  /  DBili  x   /  AST  12  /  ALT  6<L>  /  AlkPhos  57  06-28    PT/INR - ( 28 Jun 2017 19:57 )   PT: 12.2 sec;   INR: 1.10          PTT - ( 28 Jun 2017 19:57 )  PTT:28.2 sec      RADIOLOGY & ADDITIONAL STUDIES  < from: US Duplex Venous Lower Ext Complete, Bilateral (06.29.17 @ 15:43) >  INTERPRETATION:  VENOUS DUPLEX DOPPLER OF BOTH LOWER EXTREMITIES dated   6/29/2017 3:43 PM    INDICATION: b/l LE edema    TECHNIQUE: Duplex Doppler evaluation including gray-scale ultrasound   imaging, color flow Doppler imaging, and Doppler spectral analysis of the   veins of both lower extremities was performed.     COMPARISON: None    FINDINGS:    Thigh veins: The common femoral, femoral, popliteal, proximal greater   saphenous, and proximal deep femoral veins are patent and free of   thrombus bilaterally. The veins are normally compressible and have normal   phasic flow and augmentation response.    Calf veins: The paired peroneal and posterior tibial calf veins are   suboptimally visualized.    Soft tissue edema is visible in both calves. In the right popliteal   fossa, there is a large thick-walled cystic lesion measuring 9.4 x 1.7 x   2.9 cm. In the left popliteal fossa,there is a large thick-walled cystic   lesion measuring 8.3 x 2.0 x 4.7 cm. In the right groin, two enlarged   nodes are noted, one measuring 4.4 x 1.5 x 1.9 cm and the other measuring   4.2 x 1.5 x 1.5 cm. Both of these nodes have a fatty hilus which suggests   benignity.      IMPRESSION:  1.  No deep vein thrombosis seen above the knees.  2.  Large bilateral popliteal cysts, both with thick and irregular walls,   possibly secondary to synovitis.  3.  Bilateral lower extremity edema.  4.  Two enlarged right inguinal nodes.    < end of copied text >

## 2017-06-29 NOTE — H&P ADULT - ATTENDING COMMENTS
Pt seen and examined by me at [facility]. 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   PMD: Dr. Martinez.   Saw a vascular surgeon a couple of months ago. Has been prescribe water pills but noncompliant 2/2 urinary frequency.   VSS, exam as above.   lab reviewed. EKG reviewed.  a/p:  1. Fall likely 2/2 multifactorial: PT consult  2. R heel ulcer with bilateral LE lymphedema: Appreciate podiatry consult, ok to c/w zosyn for now, f/u wound cx; Vascular consult  3.?BRIAN: please obtain collateral information from PMD: hold off IVF, chech UA/urine lytes  4. PAD: vascular consult  5. Hypertensive emergency with troponemia and EKG changes: repeat EKG reviewed, ACS ruled out, hold off norvasc given bilateral LE lymphedema; can start back on ACEI if has underlying CKD.  6. DMII with diabetic neuropathy: c/w ISS  7. Anemia: check iron panel  rest of a/p as above

## 2017-06-29 NOTE — H&P ADULT - PROBLEM SELECTOR PLAN 7
Hx of glaucoma b/l eyes, s/p surgery right eye (blind), left eye almost blind. Uses ketorolac qid left eye, combigan bid, dorzolamide tid, lumigan qhs.  - do not have some of these, will need to send patient's meds to verify Hx of DM. On glipizide at home.  - f/u HgbA1C  - MISS

## 2017-06-29 NOTE — PROGRESS NOTE ADULT - PROBLEM SELECTOR PLAN 7
History of glaucoma b/l eyes, s/p surgery in right eye, left eye partially blind. Uses ketorolac QID left eye, combigan BID, dorzolamide TID lumigan QHS  -Most medications not on formulary in hospital patient to bring drops for pharmacy here

## 2017-06-29 NOTE — PROGRESS NOTE ADULT - PROBLEM SELECTOR PLAN 1
History of previous falls. Based on history, with lack of dizziness, syncope, and cardiac sxs it is most likely due to mechanical issues related to lower leg edema.   -Vascular consulted appreciate rec's  -PT consulted appreciate rec's  -Continue Zosyn 3.375g q8 for cellulitis

## 2017-06-29 NOTE — PROGRESS NOTE ADULT - ASSESSMENT
66 year old male presents with right plantar heel ulceration.   - Wound assessed, packing placed, and Santyl placed followed by DSD  - Recommend expanding antibiotic coverage to include more gram positive organisms.    - Deep wound culture taken at time of encounter and submitted to microbiology.   - Awaiting official X-ray read.

## 2017-06-29 NOTE — H&P ADULT - ASSESSMENT
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

## 2017-06-29 NOTE — PROGRESS NOTE ADULT - PROBLEM SELECTOR PLAN 3
Patient has chronic venous insufficiency, awaiting collateral from PCP.  -Vascular consulted  -Bilateral LE Doppler ordered

## 2017-06-29 NOTE — H&P ADULT - PROBLEM SELECTOR PLAN 3
Severe swelling of b/l LE.  - vascular consult in am Elevated BUN/Cr, dry MM on exam  - c/w IVF NS 100cc/hr  - lytes if not improving

## 2017-06-29 NOTE — PROGRESS NOTE ADULT - PROBLEM SELECTOR PLAN 6
Patient Cr elevated, unsure if this is chronic or acute injury at this time. More collateral information needed.   -IV fluids held due to edema  -Contact PCP regarding baseline Cr Patient Cr elevated, unsure if this is chronic or acute injury at this time. More collateral information needed.   -IV fluids held due to edema  -Contact PCP regarding baseline Cr  -UA/Urine Electrolytes ordered

## 2017-06-29 NOTE — PROGRESS NOTE ADULT - SUBJECTIVE AND OBJECTIVE BOX
O/N Events:  Subjective/ROS: Denies HA, CP, SOB, n/v, changes in bowel/urinary habits.  12pt ROS otherwise negative.    VITALS  Vital Signs Last 24 Hrs  T(C): 36.9 (29 Jun 2017 07:00), Max: 36.9 (28 Jun 2017 19:01)  T(F): 98.5 (29 Jun 2017 07:00), Max: 98.5 (28 Jun 2017 19:01)  HR: 76 (29 Jun 2017 07:00) (63 - 80)  BP: 179/84 (29 Jun 2017 07:00) (172/78 - 212/78)  BP(mean): --  RR: 18 (29 Jun 2017 07:00) (18 - 19)  SpO2: 97% (29 Jun 2017 07:00) (95% - 97%)    CAPILLARY BLOOD GLUCOSE  121 (29 Jun 2017 06:15)          PHYSICAL EXAM  General: A&Ox3; NAD  Head: NC/AT; MMM; PERRL; EOMI;  Neck: Supple; no JVD  Respiratory: CTA B/L; no wheezes/crackles   Cardiovascular: Regular rhythm/rate; S1/S2   Gastrointestinal: Soft; NTND; normoactive BS  Extremities: WWP; no edema/cyanosis  Neurological:  CNII-XII grossly intact; no obvious focal deficits    MEDICATIONS  (STANDING):  sodium chloride 0.9%. 1000 milliLiter(s) (100 mL/Hr) IV Continuous <Continuous>  lisinopril 40 milliGRAM(s) Oral daily  ATENolol  Tablet 50 milliGRAM(s) Oral daily  amLODIPine   Tablet 10 milliGRAM(s) Oral daily  latanoprost 0.005% Ophthalmic Solution 1 Drop(s) Both EYES at bedtime  piperacillin/tazobactam IVPB. 3.375 Gram(s) IV Intermittent every 6 hours  heparin  Injectable 7500 Unit(s) SubCutaneous every 8 hours  insulin lispro (HumaLOG) corrective regimen sliding scale   SubCutaneous Before meals and at bedtime  dextrose 5%. 1000 milliLiter(s) (50 mL/Hr) IV Continuous <Continuous>  dextrose 50% Injectable 12.5 Gram(s) IV Push once  dextrose 50% Injectable 25 Gram(s) IV Push once  dextrose 50% Injectable 25 Gram(s) IV Push once  dorzolamide 2% Ophthalmic Solution 1 Drop(s) Both EYES three times a day  collagenase Ointment 1 Application(s) Topical daily    MEDICATIONS  (PRN):  dextrose Gel 1 Dose(s) Oral once PRN Blood Glucose LESS THAN 70 milliGRAM(s)/deciliter  glucagon  Injectable 1 milliGRAM(s) IntraMuscular once PRN Glucose LESS THAN 70 milligrams/deciliter      tetanus-diphth toxoids (Td) adult/adol (Unknown)      LABS                        9.7    11.9  )-----------( 235      ( 29 Jun 2017 06:13 )             30.7     06-29    142  |  105  |  31<H>  ----------------------------<  105<H>  4.2   |  24  |  1.80<H>    Ca    8.6      29 Jun 2017 06:13  Mg     2.2     06-29    TPro  7.5  /  Alb  3.4  /  TBili  0.5  /  DBili  x   /  AST  12  /  ALT  6<L>  /  AlkPhos  57  06-28    PT/INR - ( 28 Jun 2017 19:57 )   PT: 12.2 sec;   INR: 1.10          PTT - ( 28 Jun 2017 19:57 )  PTT:28.2 sec    CARDIAC MARKERS ( 29 Jun 2017 05:09 )  x     / 0.04 ng/mL / 99 U/L / x     / 3.0 ng/mL        IMAGING/EKG/ETC  EKG:  Xray:  CT:  MRI: O/N Events: Patient was admitted overnight. When seen at bedside he stated that he has problems with his gait because his legs are so heavy. He denied any CP, dizziness, palpitations, syncope, SOB.   Subjective/ROS: Denies HA, CP, SOB, n/v, changes in bowel/urinary habits.  12pt ROS otherwise negative.    VITALS  Vital Signs Last 24 Hrs  T(C): 36.9 (29 Jun 2017 07:00), Max: 36.9 (28 Jun 2017 19:01)  T(F): 98.5 (29 Jun 2017 07:00), Max: 98.5 (28 Jun 2017 19:01)  HR: 76 (29 Jun 2017 07:00) (63 - 80)  BP: 179/84 (29 Jun 2017 07:00) (172/78 - 212/78)  BP(mean): --  RR: 18 (29 Jun 2017 07:00) (18 - 19)  SpO2: 97% (29 Jun 2017 07:00) (95% - 97%)    CAPILLARY BLOOD GLUCOSE  121 (29 Jun 2017 06:15)          PHYSICAL EXAM  General: A&Ox3; NAD  Head: NC/AT; MMM;   Neck: Supple; no JVD  Respiratory: CTA B/L; no wheezes/crackles   Cardiovascular: Regular rhythm/rate; S1/S2   Gastrointestinal: Soft; NTND; normoactive BS  Extremities: presence of bilateral lower extremity erythema covering lower half of lower extremity with satellite lesions. Warm to touch.   Neurological:  no obvious focal deficits    MEDICATIONS  (STANDING):  sodium chloride 0.9%. 1000 milliLiter(s) (100 mL/Hr) IV Continuous <Continuous>  lisinopril 40 milliGRAM(s) Oral daily  ATENolol  Tablet 50 milliGRAM(s) Oral daily  amLODIPine   Tablet 10 milliGRAM(s) Oral daily  latanoprost 0.005% Ophthalmic Solution 1 Drop(s) Both EYES at bedtime  piperacillin/tazobactam IVPB. 3.375 Gram(s) IV Intermittent every 6 hours  heparin  Injectable 7500 Unit(s) SubCutaneous every 8 hours  insulin lispro (HumaLOG) corrective regimen sliding scale   SubCutaneous Before meals and at bedtime  dextrose 5%. 1000 milliLiter(s) (50 mL/Hr) IV Continuous <Continuous>  dextrose 50% Injectable 12.5 Gram(s) IV Push once  dextrose 50% Injectable 25 Gram(s) IV Push once  dextrose 50% Injectable 25 Gram(s) IV Push once  dorzolamide 2% Ophthalmic Solution 1 Drop(s) Both EYES three times a day  collagenase Ointment 1 Application(s) Topical daily    MEDICATIONS  (PRN):  dextrose Gel 1 Dose(s) Oral once PRN Blood Glucose LESS THAN 70 milliGRAM(s)/deciliter  glucagon  Injectable 1 milliGRAM(s) IntraMuscular once PRN Glucose LESS THAN 70 milligrams/deciliter      tetanus-diphth toxoids (Td) adult/adol (Unknown)      LABS                        9.7    11.9  )-----------( 235      ( 29 Jun 2017 06:13 )             30.7     06-29    142  |  105  |  31<H>  ----------------------------<  105<H>  4.2   |  24  |  1.80<H>    Ca    8.6      29 Jun 2017 06:13  Mg     2.2     06-29    TPro  7.5  /  Alb  3.4  /  TBili  0.5  /  DBili  x   /  AST  12  /  ALT  6<L>  /  AlkPhos  57  06-28    PT/INR - ( 28 Jun 2017 19:57 )   PT: 12.2 sec;   INR: 1.10          PTT - ( 28 Jun 2017 19:57 )  PTT:28.2 sec    CARDIAC MARKERS ( 29 Jun 2017 05:09 )  x     / 0.04 ng/mL / 99 U/L / x     / 3.0 ng/mL      Troponin T, Serum: 0.06:     EKG 6/29 am: Normal Sinus Rhythm with T-wave inversions in II, III, aVF, V4-V6  EKG 6/29 pm: NSR no T-wave inversions resolved O/N Events: Patient was admitted overnight. When seen at bedside he stated that he has problems with his gait because his legs are so heavy. He denied any CP, dizziness, palpitations, syncope, SOB.   Subjective/ROS: Denies HA, CP, SOB, n/v, changes in bowel/urinary habits.  12pt ROS otherwise negative.    VITALS  Vital Signs Last 24 Hrs  T(C): 36.9 (29 Jun 2017 07:00), Max: 36.9 (28 Jun 2017 19:01)  T(F): 98.5 (29 Jun 2017 07:00), Max: 98.5 (28 Jun 2017 19:01)  HR: 76 (29 Jun 2017 07:00) (63 - 80)  BP: 179/84 (29 Jun 2017 07:00) (172/78 - 212/78)  BP(mean): --  RR: 18 (29 Jun 2017 07:00) (18 - 19)  SpO2: 97% (29 Jun 2017 07:00) (95% - 97%)    CAPILLARY BLOOD GLUCOSE  121 (29 Jun 2017 06:15)          PHYSICAL EXAM  General: A&Ox3; NAD  Head: NC/AT; MMM;   Neck: Supple; no JVD  Respiratory: CTA B/L; no wheezes/crackles   Cardiovascular: Regular rhythm/rate; S1/S2   Gastrointestinal: Soft; NTND; normoactive BS  Extremities: presence of bilateral lower extremity erythema covering lower half of lower extremity with satellite lesions. Warm to touch. Presence of right heel ulcer.  Neurological:  no obvious focal deficits    MEDICATIONS  (STANDING):  sodium chloride 0.9%. 1000 milliLiter(s) (100 mL/Hr) IV Continuous <Continuous>  lisinopril 40 milliGRAM(s) Oral daily  ATENolol  Tablet 50 milliGRAM(s) Oral daily  amLODIPine   Tablet 10 milliGRAM(s) Oral daily  latanoprost 0.005% Ophthalmic Solution 1 Drop(s) Both EYES at bedtime  piperacillin/tazobactam IVPB. 3.375 Gram(s) IV Intermittent every 6 hours  heparin  Injectable 7500 Unit(s) SubCutaneous every 8 hours  insulin lispro (HumaLOG) corrective regimen sliding scale   SubCutaneous Before meals and at bedtime  dextrose 5%. 1000 milliLiter(s) (50 mL/Hr) IV Continuous <Continuous>  dextrose 50% Injectable 12.5 Gram(s) IV Push once  dextrose 50% Injectable 25 Gram(s) IV Push once  dextrose 50% Injectable 25 Gram(s) IV Push once  dorzolamide 2% Ophthalmic Solution 1 Drop(s) Both EYES three times a day  collagenase Ointment 1 Application(s) Topical daily    MEDICATIONS  (PRN):  dextrose Gel 1 Dose(s) Oral once PRN Blood Glucose LESS THAN 70 milliGRAM(s)/deciliter  glucagon  Injectable 1 milliGRAM(s) IntraMuscular once PRN Glucose LESS THAN 70 milligrams/deciliter      tetanus-diphth toxoids (Td) adult/adol (Unknown)      LABS                        9.7    11.9  )-----------( 235      ( 29 Jun 2017 06:13 )             30.7     06-29    142  |  105  |  31<H>  ----------------------------<  105<H>  4.2   |  24  |  1.80<H>    Ca    8.6      29 Jun 2017 06:13  Mg     2.2     06-29    TPro  7.5  /  Alb  3.4  /  TBili  0.5  /  DBili  x   /  AST  12  /  ALT  6<L>  /  AlkPhos  57  06-28    PT/INR - ( 28 Jun 2017 19:57 )   PT: 12.2 sec;   INR: 1.10          PTT - ( 28 Jun 2017 19:57 )  PTT:28.2 sec    CARDIAC MARKERS ( 29 Jun 2017 05:09 )  x     / 0.04 ng/mL / 99 U/L / x     / 3.0 ng/mL      Troponin T, Serum: 0.06:     EKG 6/29 am: Normal Sinus Rhythm with T-wave inversions in II, III, aVF, V4-V6  EKG 6/29 pm: NSR no T-wave inversions resolved

## 2017-06-29 NOTE — H&P ADULT - PROBLEM SELECTOR PLAN 5
Hx of PAD.  - vascular consult in am Hypertensive to SBP 170s. Asymptomatic at this time.  - restart home meds amlodipine 10mg daily, atenolol 50mg daily, takes quinapril 40mg at home (ordered lisinopril)

## 2017-06-29 NOTE — H&P ADULT - NSHPPHYSICALEXAM_GEN_ALL_CORE
VITAL SIGNS:  T(F): 98.5 (06-28-17 @ 19:01)  HR: 79 (06-29-17 @ 00:20)  BP: 178/74 (06-29-17 @ 00:20)  RR: 18 (06-29-17 @ 00:20)  SpO2: 95% (06-29-17 @ 00:20)  Wt(kg): --    PHYSICAL EXAM:    Constitutional: elderly appearing male, NAD  Eyes: L pupil sluggish but reactive to light, R pupil s/p surgical changes non-reactive to light; EOMI, completely blind R eye  ENMT: dry MM  Neck: supple, no JVD  Respiratory: CTAb/l, no wheezes/rales, rhonchi  Cardiovascular: RRR, normal S1/S2, no murmurs  Gastrointestinal: soft, NTND, BS+  Extremities: severe 3+ pitting edema b/l LE w/ skin changes, R heel ulcer s/p debridement by podiatry  Vascular: warm, DP pulses difficult to palpate 2/2 edema; femoral pulses 2+ b/l  Neurological: AOx3, responds to all commands, limited strength to b/l LE 2/2 pain/edema, sensation intact, +tender to palpation b/l LE  Musculoskeletal: no joint swelling VITAL SIGNS:  T(F): 98.5 (06-28-17 @ 19:01)  HR: 79 (06-29-17 @ 00:20)  BP: 178/74 (06-29-17 @ 00:20)  RR: 18 (06-29-17 @ 00:20)  SpO2: 95% (06-29-17 @ 00:20)  Wt(kg): --    PHYSICAL EXAM:    Constitutional: elderly appearing male, NAD  Eyes: L pupil sluggish but reactive to light, R pupil s/p surgical changes non-reactive to light; EOMI, completely blind R eye  ENMT: dry MM  Neck: supple, no JVD  Respiratory: CTAb/l, no wheezes/rales, rhonchi  Cardiovascular: RRR, normal S1/S2, no murmurs  Gastrointestinal: soft, NTND, BS+  Extremities: severe 3+ pitting edema b/l LE w/ skin changes, R heel ulcer w/o discharge, malodorous, no surrounding erythema; b/l leg erythema  Vascular: warm, DP pulses difficult to palpate 2/2 edema; femoral pulses 2+ b/l  Neurological: AOx3, responds to all commands, CNVI-XII grossly intact, no facial droop, limited strength to b/l LE 2/2 pain/edema, sensation intact, +tender to palpation b/l LE  Musculoskeletal: no joint swelling

## 2017-06-29 NOTE — PROGRESS NOTE ADULT - PROBLEM SELECTOR PLAN 2
Right heel ulcer s/p bedside debridement by Podiatry yesterday. Still no drainage.  -Continue Zosyn 3.375g q8 for cellulitis  -Appreciate Podiatry rec's

## 2017-06-29 NOTE — PROGRESS NOTE ADULT - SUBJECTIVE AND OBJECTIVE BOX
PROGRESS NOTE   Patient is a 66y old  Male who presents with a chief complaint of falls (29 Jun 2017 03:10)    Interval History: Patient presented to [facility] ED yesterday evening with concern of chronic falls. Upon admission, it was noted that patient has 3cm plantar heel ulceration on the right foot.  Patient denies pain to the area, and denies any constitutional symptoms F/C/N/V/SOB.    Vital Signs Last 24 Hrs  T(C): 36.9 (29 Jun 2017 07:00), Max: 36.9 (28 Jun 2017 19:01)  T(F): 98.5 (29 Jun 2017 07:00), Max: 98.5 (28 Jun 2017 19:01)  HR: 76 (29 Jun 2017 07:00) (63 - 80)  BP: 179/84 (29 Jun 2017 07:00) (172/78 - 212/78)  BP(mean): --  RR: 18 (29 Jun 2017 07:00) (18 - 19)  SpO2: 97% (29 Jun 2017 07:00) (95% - 97%)                          9.7    11.9  )-----------( 235      ( 29 Jun 2017 06:13 )             30.7               06-29    142  |  105  |  31<H>  ----------------------------<  105<H>  4.2   |  24  |  1.80<H>    Ca    8.6      29 Jun 2017 06:13  Mg     2.2     06-29    TPro  7.5  /  Alb  3.4  /  TBili  0.5  /  DBili  x   /  AST  12  /  ALT  6<L>  /  AlkPhos  57  06-28      PHYSICAL EXAM  GEN: NEELIMA HONG is a pleasant well-nourished, well developed 66y Male in no acute distress, alert awake, and oriented to person, place and time.   LE Focused:  DP and PT pulses nonpalpable due to +3 pitting edema with appreciable swelling and erythema in both lower extremities consistent with venous stasis.  A 3cm plantar heel ulceration on the right lower extremity probes approximately 2cm deep to soft tissue.  Wound does not undermine, however, approximately 2.5cc of serous drainage (yellow) was evacuated from wound.  Wound was irrigated and sterile packing was placed in wound.  Wound base is fibrous in nature.  Epicritic sensation and general sensorium is diminished bilateral.

## 2017-06-29 NOTE — PROGRESS NOTE ADULT - ASSESSMENT
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

## 2017-06-29 NOTE — H&P ADULT - PROBLEM SELECTOR PLAN 4
Hypertensive to SBP Hypertensive to SBP 170s. Asymptomatic at this time.  - restart home meds amlodipine 10mg daily, atenolol 50mg daily, takes quinapril 40mg at home (ordered lisinopril) Severe swelling of b/l LE.  - vascular consult in am

## 2017-06-29 NOTE — CONSULT NOTE ADULT - ASSESSMENT
67 yo M with severe lymphedema, cellulitis and right heel ulcer, wound being followed by podiatry, afebrile, no signs of sepsis  1-ABX as per primary team  2-Daily wound care by podiatry  3-Recommend compression with ACE bandage and elevation 65 yo M with severe lymphedema, cellulitis and right heel ulcer, wound being followed by podiatry, afebrile, no signs of sepsis  1-ABX as per primary team  2-Daily wound care by podiatry  3-Recommend compression with ACE bandage and elevation      Addendum: No Vascular Surgery intervention indicated at this time. Please re-consult if change in current status.

## 2017-06-29 NOTE — H&P ADULT - PROBLEM SELECTOR PLAN 2
Right heel ulcer, 2.5cm x 2.8cm x 0.5cm, no drainage, malodorous, no surrounding erythema, Right heel ulcer, 2.5cm x 2.8cm x 0.5cm, no drainage, malodorous, no surrounding erythema, s/p debridement with podiatry  - appreciate podiatry recs, will continue zosyn 3.375mg q8hrs  - wound care Right heel ulcer, 2.5cm x 2.8cm x 0.5cm, no drainage, malodorous, no surrounding erythema, s/p debridement with podiatry, did not probe to bone  - appreciate podiatry recs, will continue zosyn 3.375mg q8hrs  - wound care  - f/u ESR, CRP

## 2017-06-29 NOTE — PROGRESS NOTE ADULT - PROBLEM SELECTOR PLAN 5
Hypertensive to SBP 170s, asymptomatic. Given Norvasc risk of causing lower extremity edema will hold at this time.  -D/C Lisinopril in setting of BRIAN  -D/C Norvasc in setting of lower extremity edema  -Patient continued on home medication of Atenolol 50mg qd  -Patient continued on home medication of triamterene-HCTZ 37.5/25  -Will continue to monitor HTN and adjust medications as needed

## 2017-06-29 NOTE — H&P ADULT - PROBLEM SELECTOR PLAN 8
HSQ q8 Hx of glaucoma b/l eyes, s/p surgery right eye (blind), left eye almost blind. Uses ketorolac qid left eye, combigan bid, dorzolamide tid, lumigan qhs.  - do not have some of these, will need to send patient's meds to verify

## 2017-06-29 NOTE — H&P ADULT - HISTORY OF PRESENT ILLNESS
65 y/o M PMHx HTN, DMT2 with peripheral neuropathy, PAD, chronic venous insufficiency, chronic bilateral LE cellulitis, BPH, blind L eye, 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 Patient was walking up the stairs, fell 3 times today. Stated that he had some mild dizziness at the time, described as himself spinning, fell back and landed on his buttocks and hit his head on the steps. Pt denies headaches, palpitations, chest pain, shortness of breath, nausea, abdominal pain. Patient usually walks with 2 canes, however his vision has been getting worse and there is a ?plan for further surgery on his left eye.     In the ED, T: 67 y/o M PMHx HTN, DMT2 with peripheral neuropathy, PAD, chronic venous insufficiency, chronic bilateral LE cellulitis, BPH, blind L eye, 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 Patient was walking up the stairs, fell 3 times today. Stated that he had some mild dizziness at the time, described as himself spinning, fell back and landed on his buttocks and hit his head on the steps. Pt denies headaches, palpitations, chest pain, shortness of breath, nausea, abdominal pain. Patient usually walks with 2 canes, however his vision has been getting worse and there is a ?plan for further surgery on his left eye. He has significant LE edema that has been worsening over the last month. Pt also c/o right heel pain w/ walking, ulcer also present for 1 month. Denies fever, chills.    In the ED, T: 98.5, HR 79-80, -180/74-80, R 18, SaO2 95-97% RA.  Zosyn 3.375mg IV x1  CT head no hemorrhage or infarct, no fractures. 67 y/o M PMHx HTN, DMT2 with peripheral neuropathy, PAD, chronic venous insufficiency, chronic bilateral LE cellulitis, BPH, blind L eye, 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 Patient was walking up the stairs, fell 3 times today. Stated that he had some mild dizziness at the time, described as himself spinning, fell back and landed on his buttocks and hit his head on the steps. Denies LOC, headaches, palpitations, chest pain, shortness of breath, nausea, abdominal pain. Patient usually walks with 2 canes, however his vision has been getting worse and there is a ?plan for further surgery on his left eye. He has significant LE edema that has been worsening over the last month. Pt also c/o right heel pain w/ walking, ulcer also present for 1 month. Denies fever, chills.    In the ED, T: 98.5, HR 79-80, -180/74-80, R 18, SaO2 95-97% RA.  Zosyn 3.375mg IV x1  CT head no hemorrhage or infarct, no fractures.

## 2017-06-29 NOTE — H&P ADULT - FAMILY HISTORY
Father  Still living? Unknown  Family history of glaucoma, Age at diagnosis: Age Unknown  Family history of hypertension, Age at diagnosis: Age Unknown     Mother  Still living? Unknown  Family history of glaucoma, Age at diagnosis: Age Unknown  Family history of hypertension, Age at diagnosis: Age Unknown

## 2017-06-30 DIAGNOSIS — L03.119 CELLULITIS OF UNSPECIFIED PART OF LIMB: ICD-10-CM

## 2017-06-30 DIAGNOSIS — R74.8 ABNORMAL LEVELS OF OTHER SERUM ENZYMES: ICD-10-CM

## 2017-06-30 DIAGNOSIS — I16.0 HYPERTENSIVE URGENCY: ICD-10-CM

## 2017-06-30 LAB
ANION GAP SERPL CALC-SCNC: 11 MMOL/L — SIGNIFICANT CHANGE UP (ref 5–17)
ANION GAP SERPL CALC-SCNC: 13 MMOL/L — SIGNIFICANT CHANGE UP (ref 5–17)
BUN SERPL-MCNC: 24 MG/DL — HIGH (ref 7–23)
BUN SERPL-MCNC: 26 MG/DL — HIGH (ref 7–23)
CALCIUM SERPL-MCNC: 8.4 MG/DL — SIGNIFICANT CHANGE UP (ref 8.4–10.5)
CALCIUM SERPL-MCNC: 8.7 MG/DL — SIGNIFICANT CHANGE UP (ref 8.4–10.5)
CHLORIDE SERPL-SCNC: 101 MMOL/L — SIGNIFICANT CHANGE UP (ref 96–108)
CHLORIDE SERPL-SCNC: 102 MMOL/L — SIGNIFICANT CHANGE UP (ref 96–108)
CO2 SERPL-SCNC: 24 MMOL/L — SIGNIFICANT CHANGE UP (ref 22–31)
CO2 SERPL-SCNC: 27 MMOL/L — SIGNIFICANT CHANGE UP (ref 22–31)
CREAT SERPL-MCNC: 1.5 MG/DL — HIGH (ref 0.5–1.3)
CREAT SERPL-MCNC: 1.6 MG/DL — HIGH (ref 0.5–1.3)
GLUCOSE SERPL-MCNC: 132 MG/DL — HIGH (ref 70–99)
GLUCOSE SERPL-MCNC: 149 MG/DL — HIGH (ref 70–99)
GRAM STN FLD: SIGNIFICANT CHANGE UP
HCT VFR BLD CALC: 31.9 % — LOW (ref 39–50)
HGB BLD-MCNC: 10 G/DL — LOW (ref 13–17)
MAGNESIUM SERPL-MCNC: 2 MG/DL — SIGNIFICANT CHANGE UP (ref 1.6–2.6)
MAGNESIUM SERPL-MCNC: 2.1 MG/DL — SIGNIFICANT CHANGE UP (ref 1.6–2.6)
MCHC RBC-ENTMCNC: 23.6 PG — LOW (ref 27–34)
MCHC RBC-ENTMCNC: 31.3 G/DL — LOW (ref 32–36)
MCV RBC AUTO: 75.4 FL — LOW (ref 80–100)
PLATELET # BLD AUTO: 244 K/UL — SIGNIFICANT CHANGE UP (ref 150–400)
POTASSIUM SERPL-MCNC: 3.7 MMOL/L — SIGNIFICANT CHANGE UP (ref 3.5–5.3)
POTASSIUM SERPL-MCNC: 3.8 MMOL/L — SIGNIFICANT CHANGE UP (ref 3.5–5.3)
POTASSIUM SERPL-SCNC: 3.7 MMOL/L — SIGNIFICANT CHANGE UP (ref 3.5–5.3)
POTASSIUM SERPL-SCNC: 3.8 MMOL/L — SIGNIFICANT CHANGE UP (ref 3.5–5.3)
RBC # BLD: 4.23 M/UL — SIGNIFICANT CHANGE UP (ref 4.2–5.8)
RBC # FLD: 17.4 % — HIGH (ref 10.3–16.9)
SODIUM SERPL-SCNC: 139 MMOL/L — SIGNIFICANT CHANGE UP (ref 135–145)
SODIUM SERPL-SCNC: 139 MMOL/L — SIGNIFICANT CHANGE UP (ref 135–145)
SPECIMEN SOURCE: SIGNIFICANT CHANGE UP
TROPONIN T SERPL-MCNC: 0.04 NG/ML — HIGH (ref 0–0.01)
WBC # BLD: 14.4 K/UL — HIGH (ref 3.8–10.5)
WBC # FLD AUTO: 14.4 K/UL — HIGH (ref 3.8–10.5)

## 2017-06-30 PROCEDURE — 99233 SBSQ HOSP IP/OBS HIGH 50: CPT | Mod: GC

## 2017-06-30 PROCEDURE — 93010 ELECTROCARDIOGRAM REPORT: CPT

## 2017-06-30 RX ORDER — CARVEDILOL PHOSPHATE 80 MG/1
6.25 CAPSULE, EXTENDED RELEASE ORAL EVERY 12 HOURS
Qty: 0 | Refills: 0 | Status: DISCONTINUED | OUTPATIENT
Start: 2017-06-30 | End: 2017-06-30

## 2017-06-30 RX ORDER — TIMOLOL 0.5 %
1 DROPS OPHTHALMIC (EYE)
Qty: 0 | Refills: 0 | Status: DISCONTINUED | OUTPATIENT
Start: 2017-06-30 | End: 2017-07-03

## 2017-06-30 RX ORDER — VANCOMYCIN HCL 1 G
1000 VIAL (EA) INTRAVENOUS EVERY 8 HOURS
Qty: 0 | Refills: 0 | Status: DISCONTINUED | OUTPATIENT
Start: 2017-06-30 | End: 2017-06-30

## 2017-06-30 RX ORDER — HYDRALAZINE HCL 50 MG
10 TABLET ORAL ONCE
Qty: 0 | Refills: 0 | Status: COMPLETED | OUTPATIENT
Start: 2017-06-30 | End: 2017-06-30

## 2017-06-30 RX ORDER — ACETAMINOPHEN 500 MG
650 TABLET ORAL ONCE
Qty: 0 | Refills: 0 | Status: COMPLETED | OUTPATIENT
Start: 2017-06-30 | End: 2017-06-30

## 2017-06-30 RX ORDER — BRIMONIDINE TARTRATE 2 MG/MG
1 SOLUTION/ DROPS OPHTHALMIC
Qty: 0 | Refills: 0 | Status: DISCONTINUED | OUTPATIENT
Start: 2017-06-30 | End: 2017-07-03

## 2017-06-30 RX ORDER — LABETALOL HCL 100 MG
10 TABLET ORAL ONCE
Qty: 0 | Refills: 0 | Status: COMPLETED | OUTPATIENT
Start: 2017-06-30 | End: 2017-06-30

## 2017-06-30 RX ORDER — VANCOMYCIN HCL 1 G
1500 VIAL (EA) INTRAVENOUS EVERY 12 HOURS
Qty: 0 | Refills: 0 | Status: DISCONTINUED | OUTPATIENT
Start: 2017-06-30 | End: 2017-07-01

## 2017-06-30 RX ORDER — AMLODIPINE BESYLATE 2.5 MG/1
10 TABLET ORAL DAILY
Qty: 0 | Refills: 0 | Status: DISCONTINUED | OUTPATIENT
Start: 2017-06-30 | End: 2017-07-03

## 2017-06-30 RX ORDER — LABETALOL HCL 100 MG
200 TABLET ORAL THREE TIMES A DAY
Qty: 0 | Refills: 0 | Status: DISCONTINUED | OUTPATIENT
Start: 2017-06-30 | End: 2017-07-03

## 2017-06-30 RX ORDER — POTASSIUM CHLORIDE 20 MEQ
40 PACKET (EA) ORAL ONCE
Qty: 0 | Refills: 0 | Status: COMPLETED | OUTPATIENT
Start: 2017-06-30 | End: 2017-06-30

## 2017-06-30 RX ORDER — METOPROLOL TARTRATE 50 MG
10 TABLET ORAL ONCE
Qty: 0 | Refills: 0 | Status: DISCONTINUED | OUTPATIENT
Start: 2017-06-30 | End: 2017-06-30

## 2017-06-30 RX ORDER — LISINOPRIL 2.5 MG/1
40 TABLET ORAL DAILY
Qty: 0 | Refills: 0 | Status: DISCONTINUED | OUTPATIENT
Start: 2017-06-30 | End: 2017-07-03

## 2017-06-30 RX ADMIN — Medication 1 APPLICATION(S): at 11:03

## 2017-06-30 RX ADMIN — LISINOPRIL 40 MILLIGRAM(S): 2.5 TABLET ORAL at 10:57

## 2017-06-30 RX ADMIN — AMLODIPINE BESYLATE 10 MILLIGRAM(S): 2.5 TABLET ORAL at 14:37

## 2017-06-30 RX ADMIN — HEPARIN SODIUM 7500 UNIT(S): 5000 INJECTION INTRAVENOUS; SUBCUTANEOUS at 22:21

## 2017-06-30 RX ADMIN — DORZOLAMIDE HYDROCHLORIDE 1 DROP(S): 20 SOLUTION/ DROPS OPHTHALMIC at 22:24

## 2017-06-30 RX ADMIN — Medication 300 MILLIGRAM(S): at 10:59

## 2017-06-30 RX ADMIN — PIPERACILLIN AND TAZOBACTAM 200 GRAM(S): 4; .5 INJECTION, POWDER, LYOPHILIZED, FOR SOLUTION INTRAVENOUS at 17:30

## 2017-06-30 RX ADMIN — Medication 40 MILLIGRAM(S): at 05:27

## 2017-06-30 RX ADMIN — Medication 10 MILLIGRAM(S): at 01:11

## 2017-06-30 RX ADMIN — PIPERACILLIN AND TAZOBACTAM 200 GRAM(S): 4; .5 INJECTION, POWDER, LYOPHILIZED, FOR SOLUTION INTRAVENOUS at 01:32

## 2017-06-30 RX ADMIN — Medication 40 MILLIEQUIVALENT(S): at 04:55

## 2017-06-30 RX ADMIN — Medication 2: at 13:18

## 2017-06-30 RX ADMIN — HEPARIN SODIUM 7500 UNIT(S): 5000 INJECTION INTRAVENOUS; SUBCUTANEOUS at 07:02

## 2017-06-30 RX ADMIN — Medication 650 MILLIGRAM(S): at 23:53

## 2017-06-30 RX ADMIN — Medication 1 DROP(S): at 17:29

## 2017-06-30 RX ADMIN — Medication 1 CAPSULE(S): at 05:27

## 2017-06-30 RX ADMIN — HEPARIN SODIUM 7500 UNIT(S): 5000 INJECTION INTRAVENOUS; SUBCUTANEOUS at 13:18

## 2017-06-30 RX ADMIN — BRIMONIDINE TARTRATE 1 DROP(S): 2 SOLUTION/ DROPS OPHTHALMIC at 17:30

## 2017-06-30 RX ADMIN — ATENOLOL 50 MILLIGRAM(S): 25 TABLET ORAL at 05:27

## 2017-06-30 RX ADMIN — LATANOPROST 1 DROP(S): 0.05 SOLUTION/ DROPS OPHTHALMIC; TOPICAL at 23:53

## 2017-06-30 RX ADMIN — Medication 200 MILLIGRAM(S): at 22:23

## 2017-06-30 RX ADMIN — PIPERACILLIN AND TAZOBACTAM 200 GRAM(S): 4; .5 INJECTION, POWDER, LYOPHILIZED, FOR SOLUTION INTRAVENOUS at 13:19

## 2017-06-30 RX ADMIN — DORZOLAMIDE HYDROCHLORIDE 1 DROP(S): 20 SOLUTION/ DROPS OPHTHALMIC at 07:02

## 2017-06-30 RX ADMIN — Medication 10 MILLIGRAM(S): at 05:18

## 2017-06-30 RX ADMIN — PIPERACILLIN AND TAZOBACTAM 200 GRAM(S): 4; .5 INJECTION, POWDER, LYOPHILIZED, FOR SOLUTION INTRAVENOUS at 07:02

## 2017-06-30 RX ADMIN — Medication 10 MILLIGRAM(S): at 08:39

## 2017-06-30 RX ADMIN — DORZOLAMIDE HYDROCHLORIDE 1 DROP(S): 20 SOLUTION/ DROPS OPHTHALMIC at 13:19

## 2017-06-30 RX ADMIN — Medication 300 MILLIGRAM(S): at 22:22

## 2017-06-30 RX ADMIN — CARVEDILOL PHOSPHATE 6.25 MILLIGRAM(S): 80 CAPSULE, EXTENDED RELEASE ORAL at 17:29

## 2017-06-30 NOTE — PROGRESS NOTE ADULT - ASSESSMENT
66M with PMH of HTN, DM2, PAD, chronic venous insufficiency, chronic b/l LE edema and cellulitis, BPH, L eye blindness that is being treated for ulcer on the left lower extremity with associated cellulitis after presenting due to falls.

## 2017-06-30 NOTE — PROGRESS NOTE ADULT - PROBLEM SELECTOR PLAN 6
Patient Cr elevated, appears to be acute on chronic with baseline of 1.4  -follow for changes, treating infection with good oral intake Patient has PAD, awaiting collateral from PCP. US no DVT, just edema and cysts  -Vascular consulted, deferred to medicine for management with no interventions Patient has chronic venous insufficiency. Duplex of LE was negative for DVT  -Vascular consulted

## 2017-06-30 NOTE — PROGRESS NOTE ADULT - PROBLEM SELECTOR PROBLEM 3
Chronic venous insufficiency Diabetic ulcer of right heel associated with type 2 diabetes mellitus, unspecified ulcer stage Troponin level elevated

## 2017-06-30 NOTE — PROGRESS NOTE ADULT - PROBLEM SELECTOR PROBLEM 10
Need for prophylactic measure Type 2 diabetes mellitus with foot ulcer, without long-term current use of insulin

## 2017-06-30 NOTE — PROGRESS NOTE ADULT - PROBLEM SELECTOR PROBLEM 4
Peripheral arterial disease Falls Diabetic ulcer of right heel associated with type 2 diabetes mellitus, unspecified ulcer stage

## 2017-06-30 NOTE — PROGRESS NOTE ADULT - PROBLEM SELECTOR PLAN 4
Patient has PAD, awaiting collateral from PCP. US no DVT, just edema and cysts  -Vascular consulted, deferred to medicine for management with no interventions History of falls, likely 2/2 mechanical fall. No symptoms of syncope, cardiac workup negative.    -Vascular consulted and no recs aside from caring for wound and swelling  -PT consulted appreciate rec's  -norvasc was held for swelling Right heel ulcer s/p bedside debridement by Podiatry. Prelim culture states s. aureus presumed MRSA. Increase WBC today to 14.  -Add Vanco(Starting 6/30)  f/u culture results  -Continue Zosyn 3.375g q8 for cellulitis   -Podiatry following

## 2017-06-30 NOTE — PROGRESS NOTE ADULT - PROBLEM SELECTOR PLAN 2
Right heel ulcer s/p bedside debridement by Podiatry. Prelim culture states s. aureus presumed MRSA. Increase WBC today to 14.  -Add Vanco  -Continue Zosyn 3.375g q8 for cellulitis   -Podiatry following Right heel ulcer s/p bedside debridement by Podiatry. Prelim culture states s. aureus presumed MRSA. Increase WBC today to 14.  -Add Vanco(Starting 6/30)  -Continue Zosyn 3.375g q8 for cellulitis   -Podiatry following BPs still above goal (sbp 200s)  pt remains asx  restart ace inh  changed atenolol to coreg

## 2017-06-30 NOTE — PHYSICAL THERAPY INITIAL EVALUATION ADULT - GENERAL OBSERVATIONS, REHAB EVAL
Received semi-supine in bed with +IV, +BLE ace bandages, on room air, +edematous feet/legs bilaterally, +contact precautions (MRSA), in no apparent distress. Patient endorses bilateral knee pain 4/10 at rest associated with stiffness

## 2017-06-30 NOTE — PROGRESS NOTE ADULT - PROBLEM SELECTOR PLAN 7
History of glaucoma b/l eyes, s/p surgery in right eye, left eye partially blind. Uses ketorolac QID left eye, combigan BID, dorzolamide TID lumigan QHS  -Most medications not on formulary in hospital patient to bring drops for pharmacy here Hypertensive to SBP 200s, asymptomatic. Given Norvasc risk of causing lower extremity edema will hold at this time.  -maximize current therapy  -PRN labetalol, hydralazine  -D/C Norvasc in setting of lower extremity edema  -Will restart ACEI, will start coreg, will d/c lasix  -Patient continued on home medication of triamterene-HCTZ 37.5/25 Patient has PAD, awaiting collateral from PCP. US no DVT, just edema and cysts  -Vascular consulted, deferred to medicine for management with no interventions

## 2017-06-30 NOTE — PHYSICAL THERAPY INITIAL EVALUATION ADULT - PERTINENT HX OF CURRENT PROBLEM, REHAB EVAL
Patient is a 66 year old M who presents s/p mechanical fall walking upstairs to exit drug store. Patient states was walking up the stairs, fell 3 times as the people who tried to help him up made him lose his balance and fall again. Stated that he had some mild dizziness at the time, described as himself spinning, fell back and landed on his buttocks and hit his head on the steps. See H & P for relevant past medical history

## 2017-06-30 NOTE — PROGRESS NOTE ADULT - PROBLEM SELECTOR PLAN 10
HSQ q8    Dispo: Continue on RMF  FULL CODE HSQ q8  Dispo: Continue on RMF  FULL CODE History of DM on glipizide at home, denies prior insulin use  -Moderate ISS

## 2017-06-30 NOTE — PROGRESS NOTE ADULT - SUBJECTIVE AND OBJECTIVE BOX
INTERVAL HPI/OVERNIGHT EVENTS:  66M with PMH of HTN, DM2, PAD, chronic venous insufficiency, chronic b/l LE edema and cellulitis, BPH, L eye blindness that is being treated for ulcer on the left lower extremity with associated cellulitis after presenting due to falls.  Overnight patient was hypertensive and asymptomatic in the 220s and was given two doses of hydralazine and EKG was obtained.  His blood pressure is running high possibly because a few home meds are on hold due to BRIAN.    VITAL SIGNS:  T(F): 98.3 (06-30-17 @ 05:01)  HR: 76 (06-30-17 @ 05:01)  BP: 223/189 (06-30-17 @ 05:01)  RR: 19 (06-30-17 @ 05:01)  SpO2: 97% (06-30-17 @ 05:01)  Wt(kg): --      PHYSICAL EXAM:      Constitutional: NAD, well-developed, well-nourished  HEENT: No vision in l. eye, minimal vision in r. eye, PERRLA, EOMI, no nasal discharge, no pharyngeal erythma  Neck: No lymphadenopathy, No JVD, No carotid bruit  Respiratory: CTAB, no wheezing, no crackles, no rhonchi, no cough  Cardiovascular: S1 and S2, RRR, no M/G/R  Gastrointestinal: BS+, soft, NT/ND  Extremities: severe swelling and erythema b/l lower extremities below the knee and left heel ulcer with discharge  Vascular: unable to palpate lower extremity pulses due to severe swelling  Neurological: A/O x 3, no focal deficits  Psychiatric: Normal mood, normal affect  Musculoskeletal: 5/5 strength b/l upper and lower extremities  Skin: No rashes          MEDICATIONS  (STANDING):  ATENolol  Tablet 50 milliGRAM(s) Oral daily  latanoprost 0.005% Ophthalmic Solution 1 Drop(s) Both EYES at bedtime  piperacillin/tazobactam IVPB. 3.375 Gram(s) IV Intermittent every 6 hours  heparin  Injectable 7500 Unit(s) SubCutaneous every 8 hours  insulin lispro (HumaLOG) corrective regimen sliding scale   SubCutaneous Before meals and at bedtime  dextrose 5%. 1000 milliLiter(s) (50 mL/Hr) IV Continuous <Continuous>  dextrose 50% Injectable 12.5 Gram(s) IV Push once  dextrose 50% Injectable 25 Gram(s) IV Push once  dextrose 50% Injectable 25 Gram(s) IV Push once  dorzolamide 2% Ophthalmic Solution 1 Drop(s) Both EYES three times a day  collagenase Ointment 1 Application(s) Topical daily  vancomycin  IVPB 1500 milliGRAM(s) IV Intermittent every 12 hours    MEDICATIONS  (PRN):  dextrose Gel 1 Dose(s) Oral once PRN Blood Glucose LESS THAN 70 milliGRAM(s)/deciliter  glucagon  Injectable 1 milliGRAM(s) IntraMuscular once PRN Glucose LESS THAN 70 milligrams/deciliter      Allergies    tetanus-diphth toxoids (Td) adult/adol (Unknown)    Intolerances        LABS:                        10.0   14.4  )-----------( 244      ( 30 Jun 2017 06:55 )             31.9     06-30    139  |  101  |  24<H>  ----------------------------<  132<H>  3.8   |  27  |  1.50<H>    Ca    8.7      30 Jun 2017 06:55  Mg     2.0     06-30    TPro  7.5  /  Alb  3.4  /  TBili  0.5  /  DBili  x   /  AST  12  /  ALT  6<L>  /  AlkPhos  57  06-28    PT/INR - ( 28 Jun 2017 19:57 )   PT: 12.2 sec;   INR: 1.10          PTT - ( 28 Jun 2017 19:57 )  PTT:28.2 sec      RADIOLOGY & ADDITIONAL TESTS: INTERVAL HPI/OVERNIGHT EVENTS:  66M with PMH of HTN, DM2, PAD, chronic venous insufficiency, chronic b/l LE edema and cellulitis, BPH, L eye blindness that is being treated for ulcer on the left lower extremity with associated cellulitis after presenting due to falls.  Overnight patient was hypertensive and asymptomatic in the 220s and was given two doses of hydralazine and EKG was obtained.      ROS  (- ) headache  ( -  )fevers/chills  ( - ) dyspnea  (  - ) cough  (  - ) chest pain  (  - ) palpatations  ( - ) dizziness/lightheadedness  (  - ) nausea/vomiting  (  - ) abd pain  (  - ) diarrhea  (  - ) melena  (  - ) hematochezia  (  - ) dysuria   ( - ) hematuria  (  - ) motor weakness  ( - ) extremity numbness  ( - ) back pain  ( + ) tolerating POs  ( + ) BM  ROS: 12 point review of systems otherwise negative         VITAL SIGNS:  T(F): 98.3 (06-30-17 @ 05:01)  HR: 76 (06-30-17 @ 05:01)  BP: 223/189 (06-30-17 @ 05:01)  RR: 19 (06-30-17 @ 05:01)  SpO2: 97% (06-30-17 @ 05:01)  Wt(kg): --      PHYSICAL EXAM:      Constitutional: NAD, well-developed, well-nourished  HEENT: No vision in l. eye, minimal vision in r. eye, PERRLA, EOMI, no nasal discharge, no pharyngeal erythma  Neck: No lymphadenopathy, No JVD, No carotid bruit  Respiratory: CTAB, no wheezing, no crackles, no rhonchi, no cough  Cardiovascular: S1 and S2, RRR, no M/G/R  Gastrointestinal: BS+, soft, NT/ND  Extremities: 3+ edema bl LE, erythema from feet to proximal lower legs b/l,  left heel ulcer with discharge  Vascular: unable to palpate lower extremity pulses due to severe swelling  Neurological: A/O x 3, legally blind, remainder of CN intact, 4/5 str all 4 ext, sensation intact  Psychiatric: Normal mood, normal affect  Musculoskeletal:nml bulk,tone  Skin: see above          MEDICATIONS  (STANDING):  ATENolol  Tablet 50 milliGRAM(s) Oral daily  latanoprost 0.005% Ophthalmic Solution 1 Drop(s) Both EYES at bedtime  piperacillin/tazobactam IVPB. 3.375 Gram(s) IV Intermittent every 6 hours  heparin  Injectable 7500 Unit(s) SubCutaneous every 8 hours  insulin lispro (HumaLOG) corrective regimen sliding scale   SubCutaneous Before meals and at bedtime  dextrose 5%. 1000 milliLiter(s) (50 mL/Hr) IV Continuous <Continuous>  dextrose 50% Injectable 12.5 Gram(s) IV Push once  dextrose 50% Injectable 25 Gram(s) IV Push once  dextrose 50% Injectable 25 Gram(s) IV Push once  dorzolamide 2% Ophthalmic Solution 1 Drop(s) Both EYES three times a day  collagenase Ointment 1 Application(s) Topical daily  vancomycin  IVPB 1500 milliGRAM(s) IV Intermittent every 12 hours    MEDICATIONS  (PRN):  dextrose Gel 1 Dose(s) Oral once PRN Blood Glucose LESS THAN 70 milliGRAM(s)/deciliter  glucagon  Injectable 1 milliGRAM(s) IntraMuscular once PRN Glucose LESS THAN 70 milligrams/deciliter      Allergies    tetanus-diphth toxoids (Td) adult/adol (Unknown)    Intolerances        LABS:                        10.0   14.4  )-----------( 244      ( 30 Jun 2017 06:55 )             31.9     06-30    139  |  101  |  24<H>  ----------------------------<  132<H>  3.8   |  27  |  1.50<H>    Ca    8.7      30 Jun 2017 06:55  Mg     2.0     06-30    TPro  7.5  /  Alb  3.4  /  TBili  0.5  /  DBili  x   /  AST  12  /  ALT  6<L>  /  AlkPhos  57  06-28    PT/INR - ( 28 Jun 2017 19:57 )   PT: 12.2 sec;   INR: 1.10          PTT - ( 28 Jun 2017 19:57 )  PTT:28.2 sec      RADIOLOGY & ADDITIONAL TESTS:

## 2017-06-30 NOTE — PROGRESS NOTE ADULT - PROBLEM SELECTOR PLAN 9
DASH/TLC diet  replete electrolytes as needed  No IV fluids at this time due to edema History of glaucoma b/l eyes, s/p surgery in right eye, left eye partially blind. Uses ketorolac QID left eye, combigan BID, dorzolamide TID lumigan QHS  -Most medications not on formulary in hospital patient to bring drops for pharmacy here

## 2017-06-30 NOTE — PHYSICAL THERAPY INITIAL EVALUATION ADULT - PLANNED THERAPY INTERVENTIONS, PT EVAL
postural re-education/balance training/transfer training/gait training/bed mobility training/strengthening

## 2017-06-30 NOTE — PROGRESS NOTE ADULT - PROBLEM SELECTOR PROBLEM 8
Type 2 diabetes mellitus with foot ulcer, without long-term current use of insulin BRIAN (acute kidney injury)

## 2017-06-30 NOTE — PHYSICAL THERAPY INITIAL EVALUATION ADULT - CRITERIA FOR SKILLED THERAPEUTIC INTERVENTIONS
functional limitations in following categories/risk reduction/prevention/therapy frequency/anticipated discharge recommendation/rehab potential/impairments found

## 2017-06-30 NOTE — PROGRESS NOTE ADULT - PROBLEM SELECTOR PLAN 5
Hypertensive to SBP 200s, asymptomatic. Given Norvasc risk of causing lower extremity edema will hold at this time.  -D/C Lisinopril in setting of BRIAN but will consider restarting as Cr near baseline  -maximize current therapy  -PRN labetalol, hydralazine  -D/C Norvasc in setting of lower extremity edema  -Patient continued on home medication of Atenolol 50mg qd  -Patient continued on home medication of triamterene-HCTZ 37.5/25  -Will continue to monitor HTN and adjust medications as needed Hypertensive to SBP 200s, asymptomatic. Given Norvasc risk of causing lower extremity edema will hold at this time.  -maximize current therapy  -PRN labetalol, hydralazine  -D/C Norvasc in setting of lower extremity edema  -Will restart ACEI, will start coreg, will d/c lasix  -Patient continued on home medication of triamterene-HCTZ 37.5/25  -Will continue to monitor HTN and adjust medications as needed Patient has chronic venous insufficiency. Duplex of LE was negative for DVT  -Vascular consulted History of falls, likely 2/2 mechanical fall. No symptoms of syncope, cardiac workup negative.    -Vascular consulted and no recs aside from caring for wound and swelling  -PT consulted appreciate rec's  -norvasc was held for swelling

## 2017-06-30 NOTE — PROGRESS NOTE ADULT - PROBLEM SELECTOR PLAN 3
Patient has chronic venous insufficiency, awaiting collateral from PCP.  -Vascular consulted  -Bilateral LE Doppler ordered Patient has chronic venous insufficiency. Duplex of LE was negative for DVT  -Vascular consulted Right heel ulcer s/p bedside debridement by Podiatry. Prelim culture states s. aureus presumed MRSA. Increase WBC today to 14.  -Add Vanco(Starting 6/30)  f/u culture results  -Continue Zosyn 3.375g q8 for cellulitis   -Podiatry following doubt ACS, suspect demand ischemia due to severe hypertension.   pt w/ non specific t wave changes on ekg  peak trop 0.06  pt w/o chest pain

## 2017-06-30 NOTE — PHYSICAL THERAPY INITIAL EVALUATION ADULT - ADDITIONAL COMMENTS
Patient lives in [place] but is currently staying with his sister in [place] along with his wife and son. Patient's sister lives on the first floor, however there are two steps without handrails to enter. Prior to admission, patient was independent for all functional mobility, using 2 canes for ambulation.

## 2017-06-30 NOTE — PROGRESS NOTE ADULT - PROBLEM SELECTOR PLAN 8
History of DM on glipizide at home, denies prior insulin use  -Moderate ISS Patient Cr elevated, appears to be acute on chronic with baseline of 1.4  -follow for changes, treating infection with good oral intake  strict i/o  avoid nephrotoxic meds  ok to restart ace  hold diuretics

## 2017-06-30 NOTE — PROGRESS NOTE ADULT - PROBLEM SELECTOR PLAN 1
History of falls, likely 2/2 mechanical fall. No symptoms of syncope, cardiac workup negative.    -Vascular consulted and no recs aside from caring for wound and swelling  -PT consulted appreciate rec's  -norvasc held for swelling but may restart will cont vanc/zosyn  wound cx (from heel ulcer) (+) staph  f/u podiatry recs

## 2017-06-30 NOTE — PROGRESS NOTE ADULT - PROBLEM SELECTOR PROBLEM 2
Diabetic ulcer of right heel associated with type 2 diabetes mellitus, unspecified ulcer stage Hypertensive urgency

## 2017-06-30 NOTE — PHYSICAL THERAPY INITIAL EVALUATION ADULT - MANUAL MUSCLE TESTING RESULTS, REHAB EVAL
Strength grossly at least 3/5 based on functional assessment of bed mobility, transfers, and ambulation with the exception of hip flexion bilaterally 2+/5

## 2017-07-01 LAB
-  AMPICILLIN/SULBACTAM: SIGNIFICANT CHANGE UP
-  AMPICILLIN: SIGNIFICANT CHANGE UP
-  AMPICILLIN: SIGNIFICANT CHANGE UP
-  CEFAZOLIN: SIGNIFICANT CHANGE UP
-  CEFTRIAXONE: SIGNIFICANT CHANGE UP
-  CIPROFLOXACIN: SIGNIFICANT CHANGE UP
-  CLINDAMYCIN: SIGNIFICANT CHANGE UP
-  DAPTOMYCIN: SIGNIFICANT CHANGE UP
-  DAPTOMYCIN: SIGNIFICANT CHANGE UP
-  ERYTHROMYCIN: SIGNIFICANT CHANGE UP
-  GENTAMICIN: SIGNIFICANT CHANGE UP
-  LINEZOLID: SIGNIFICANT CHANGE UP
-  LINEZOLID: SIGNIFICANT CHANGE UP
-  OXACILLIN: SIGNIFICANT CHANGE UP
-  OXACILLIN: SIGNIFICANT CHANGE UP
-  PENICILLIN: SIGNIFICANT CHANGE UP
-  PIPERACILLIN/TAZOBACTAM: SIGNIFICANT CHANGE UP
-  RIFAMPIN: SIGNIFICANT CHANGE UP
-  RIFAMPIN: SIGNIFICANT CHANGE UP
-  TETRACYCLINE: SIGNIFICANT CHANGE UP
-  TETRACYCLINE: SIGNIFICANT CHANGE UP
-  TOBRAMYCIN: SIGNIFICANT CHANGE UP
-  TRIMETHOPRIM/SULFAMETHOXAZOLE: SIGNIFICANT CHANGE UP
-  VANCOMYCIN: SIGNIFICANT CHANGE UP
ANION GAP SERPL CALC-SCNC: 12 MMOL/L — SIGNIFICANT CHANGE UP (ref 5–17)
BUN SERPL-MCNC: 25 MG/DL — HIGH (ref 7–23)
CALCIUM SERPL-MCNC: 8 MG/DL — LOW (ref 8.4–10.5)
CHLORIDE SERPL-SCNC: 95 MMOL/L — LOW (ref 96–108)
CO2 SERPL-SCNC: 25 MMOL/L — SIGNIFICANT CHANGE UP (ref 22–31)
CREAT SERPL-MCNC: 1.5 MG/DL — HIGH (ref 0.5–1.3)
GLUCOSE SERPL-MCNC: 225 MG/DL — HIGH (ref 70–99)
HCT VFR BLD CALC: 27 % — LOW (ref 39–50)
HGB BLD-MCNC: 8.6 G/DL — LOW (ref 13–17)
MCHC RBC-ENTMCNC: 24.1 PG — LOW (ref 27–34)
MCHC RBC-ENTMCNC: 31.9 G/DL — LOW (ref 32–36)
MCV RBC AUTO: 75.6 FL — LOW (ref 80–100)
METHOD TYPE: SIGNIFICANT CHANGE UP
PLATELET # BLD AUTO: 188 K/UL — SIGNIFICANT CHANGE UP (ref 150–400)
POTASSIUM SERPL-MCNC: 3.5 MMOL/L — SIGNIFICANT CHANGE UP (ref 3.5–5.3)
POTASSIUM SERPL-SCNC: 3.5 MMOL/L — SIGNIFICANT CHANGE UP (ref 3.5–5.3)
RBC # BLD: 3.57 M/UL — LOW (ref 4.2–5.8)
RBC # FLD: 17.3 % — HIGH (ref 10.3–16.9)
SODIUM SERPL-SCNC: 132 MMOL/L — LOW (ref 135–145)
WBC # BLD: 10.8 K/UL — HIGH (ref 3.8–10.5)
WBC # FLD AUTO: 10.8 K/UL — HIGH (ref 3.8–10.5)

## 2017-07-01 PROCEDURE — 99233 SBSQ HOSP IP/OBS HIGH 50: CPT

## 2017-07-01 PROCEDURE — 99222 1ST HOSP IP/OBS MODERATE 55: CPT | Mod: GC

## 2017-07-01 RX ADMIN — PIPERACILLIN AND TAZOBACTAM 200 GRAM(S): 4; .5 INJECTION, POWDER, LYOPHILIZED, FOR SOLUTION INTRAVENOUS at 06:51

## 2017-07-01 RX ADMIN — HEPARIN SODIUM 7500 UNIT(S): 5000 INJECTION INTRAVENOUS; SUBCUTANEOUS at 14:00

## 2017-07-01 RX ADMIN — DORZOLAMIDE HYDROCHLORIDE 1 DROP(S): 20 SOLUTION/ DROPS OPHTHALMIC at 22:41

## 2017-07-01 RX ADMIN — Medication 2: at 08:57

## 2017-07-01 RX ADMIN — Medication 200 MILLIGRAM(S): at 13:07

## 2017-07-01 RX ADMIN — HEPARIN SODIUM 7500 UNIT(S): 5000 INJECTION INTRAVENOUS; SUBCUTANEOUS at 06:51

## 2017-07-01 RX ADMIN — LISINOPRIL 40 MILLIGRAM(S): 2.5 TABLET ORAL at 06:52

## 2017-07-01 RX ADMIN — LATANOPROST 1 DROP(S): 0.05 SOLUTION/ DROPS OPHTHALMIC; TOPICAL at 22:40

## 2017-07-01 RX ADMIN — Medication 200 MILLIGRAM(S): at 06:52

## 2017-07-01 RX ADMIN — Medication 100 MILLIGRAM(S): at 22:38

## 2017-07-01 RX ADMIN — Medication 200 MILLIGRAM(S): at 22:39

## 2017-07-01 RX ADMIN — Medication 4: at 12:54

## 2017-07-01 RX ADMIN — BRIMONIDINE TARTRATE 1 DROP(S): 2 SOLUTION/ DROPS OPHTHALMIC at 17:18

## 2017-07-01 RX ADMIN — DORZOLAMIDE HYDROCHLORIDE 1 DROP(S): 20 SOLUTION/ DROPS OPHTHALMIC at 06:54

## 2017-07-01 RX ADMIN — Medication 1 DROP(S): at 17:18

## 2017-07-01 RX ADMIN — AMLODIPINE BESYLATE 10 MILLIGRAM(S): 2.5 TABLET ORAL at 06:52

## 2017-07-01 RX ADMIN — Medication 1 DROP(S): at 06:54

## 2017-07-01 RX ADMIN — BRIMONIDINE TARTRATE 1 DROP(S): 2 SOLUTION/ DROPS OPHTHALMIC at 06:53

## 2017-07-01 RX ADMIN — PIPERACILLIN AND TAZOBACTAM 200 GRAM(S): 4; .5 INJECTION, POWDER, LYOPHILIZED, FOR SOLUTION INTRAVENOUS at 01:37

## 2017-07-01 RX ADMIN — Medication 1 APPLICATION(S): at 11:20

## 2017-07-01 RX ADMIN — Medication 2: at 17:17

## 2017-07-01 RX ADMIN — Medication 300 MILLIGRAM(S): at 10:00

## 2017-07-01 RX ADMIN — DORZOLAMIDE HYDROCHLORIDE 1 DROP(S): 20 SOLUTION/ DROPS OPHTHALMIC at 14:00

## 2017-07-01 RX ADMIN — Medication 100 MILLIGRAM(S): at 12:54

## 2017-07-01 RX ADMIN — HEPARIN SODIUM 7500 UNIT(S): 5000 INJECTION INTRAVENOUS; SUBCUTANEOUS at 22:38

## 2017-07-01 NOTE — PROGRESS NOTE ADULT - PROBLEM SELECTOR PLAN 3
AS above, improving. WBC trending down. No fevers.   * Now on Clindamycin.   * Podiatry recs appreciated.

## 2017-07-01 NOTE — PROGRESS NOTE ADULT - PROBLEM SELECTOR PLAN 1
-Abx changed to clindamycin, cultures showed some MRSA and other gm+  -f/u podiatry recs  -bandage changes

## 2017-07-01 NOTE — PROGRESS NOTE ADULT - PROBLEM SELECTOR PLAN 5
History of falls, likely 2/2 mechanical fall. No symptoms of syncope, cardiac workup negative.    -Vascular consulted and no recs aside from caring for wound and swelling  -PT consulted appreciate rec's

## 2017-07-01 NOTE — PROGRESS NOTE ADULT - SUBJECTIVE AND OBJECTIVE BOX
Patient seen at bedside. No overnight events. Patient denies fever, chills, nausea, vomitting, SOB and foot pain. Patient states that he has not been ambulatory since admission.     ICU Vital Signs Last 24 Hrs  T(C): 36.8 (01 Jul 2017 08:58), Max: 38.3 (30 Jun 2017 22:37)  T(F): 98.2 (01 Jul 2017 08:58), Max: 100.9 (30 Jun 2017 22:37)  HR: 56 (01 Jul 2017 08:58) (56 - 74)  BP: 156/62 (01 Jul 2017 08:58) (147/54 - 215/90)  BP(mean): --  ABP: --  ABP(mean): --  RR: 18 (01 Jul 2017 08:58) (16 - 18)  SpO2: 96% (01 Jul 2017 08:58) (95% - 97%)    HbA1c: 6.1% (6/29/17); CRP: 1.9 ; ESR 43; Cr 1.5    < from: Xray Foot AP + Lateral + Oblique, Right (06.28.17 @ 20:39) >    INTERPRETATION:  Indication: heel ulcer    3 views of the right foot demonstrate diffuse soft tissue swelling. There   is ulceration in the plantar soft tissues near the calcaneus. No discrete   osseous erosion or periosteal reaction is identified.      Impression: Soft tissue swelling and ulceration in the plantar soft   tissues near the calcaneus.      Labs pending collection     Culture - Surgical Swab (06.29.17 @ 10:05)    -  Cefazolin: R <=4    -  RIF- Rifampin: S <=1    -  Tetra/Doxy: S <=4    -  Trimethoprim/Sulfamethoxazole: S <=0.5/9.5    Gram Stain:   Moderate Gram positive cocci in pairs, chains and clusters  Moderate White blood cells    -  Linezolid: S 4    -  Oxacillin: R >2    -  Ampicillin: S    -  Daptomycin: S <=0.5    -  Penicillin: R >8    -  Penicillin: S    -  Vancomycin: S 0.75    -  Clindamycin: S    -  Clindamycin: S <=0.5    -  Erythromycin: S    -  Erythromycin: S <=0.5    -  Vancomycin: S    Specimen Source: .Surgical Swab Right Plantar Heel Ulcer    Culture Results:   Numerous Methicillin resistant Staphylococcus aureus  Result called to and read back byLyndon March RN  06/30/2017 14:13:03  Moderate Streptococcus agalactiae (Group B)  Culture being held to rule out anaerobes.    Organism Identification: Methicillin resistant Staphylococcus aureus  Methicillin resistant Staphylococcus aureus  Streptococcus agalactiae (Group B)    Organism: Methicillin resistant Staphylococcus aureus    Organism: Methicillin resistant Staphylococcus aureus    Organism: Streptococcus agalactiae (Group B)    Method Type: SHELBY    Method Type: KB    Method Type: ETEST        PE:   GEN: Patient is a 66y well developed, well nourished, unkept, Male, alert, awake and oriented to person, place and time in no acute distress.   Extremities   Vascular:  Pedal pulses non-palpable 2/2/ to +6 pitting edema b/l, Doppler: DP: biphasic regular b/l, PT: non-audible via doppler 2/2 to edema, CFT < 3 secs x10, TG: warm to warm  Derm: Lan grade 3 ulcer of the plantar aspect of the right heel measuring 2cm x 3cm x 0.5cm, no drainage, -malodor, -PTB, mixed fibrotic base, no [place] wound erythema,b/l ace bandages to lower extremities intact   Neuro: protective sensation grossly diminished b/l         Assessment and Recommendation:   · Assessment		  65 yo Diabetic M with Lan grade 3 ulcer of Right heel   -Cultures + for MRSA  -Continue broad spectrum IV antibiotics as per primary team  -recommend daily dressing changes with Nakul and LINDSAY  -Discussed with medicine team; primary care as per primary team  -Discussed with Podiatry attending, pt can follow up as outpatient at the podiatry clinic at [address], [phone]  -Reconsult Podiatry if any changes

## 2017-07-01 NOTE — PROGRESS NOTE ADULT - SUBJECTIVE AND OBJECTIVE BOX
INTERVAL HPI/OVERNIGHT EVENTS:  Pt. had no acute events overnight and was hemodynamically stable with a borderline fever but workup has already been done and back to normal in the morning.  He reports no pain or discomfort and would like to go home    VITAL SIGNS:  T(F): 99.3 (07-01-17 @ 17:11)  HR: 60 (07-01-17 @ 17:11)  BP: 141/56 (07-01-17 @ 17:11)  RR: 18 (07-01-17 @ 17:11)  SpO2: 94% (07-01-17 @ 17:11)  Wt(kg): --      PHYSICAL EXAM:      Constitutional: NAD, well-developed, well-nourished  HEENT: no nasal discharge, no pharyngeal erythma  Neck: No lymphadenopathy, No JVD, No carotid bruit  Respiratory: CTAB, no wheezing, no crackles, no rhonchi, no cough  Cardiovascular: S1 and S2, RRR, no M/G/R  Gastrointestinal: BS+, soft, NT/ND  Extremities: Severely edematous b/l lower extremities, right heel ulcer with discharge  Vascular: unable to palpate pulses  Neurological: A/O x 3, no focal deficits  Psychiatric: Normal mood, normal affect  Musculoskeletal: 5/5 strength b/l upper and lower extremities  Skin: No rashes          MEDICATIONS  (STANDING):  latanoprost 0.005% Ophthalmic Solution 1 Drop(s) Both EYES at bedtime  heparin  Injectable 7500 Unit(s) SubCutaneous every 8 hours  insulin lispro (HumaLOG) corrective regimen sliding scale   SubCutaneous Before meals and at bedtime  dextrose 5%. 1000 milliLiter(s) (50 mL/Hr) IV Continuous <Continuous>  dextrose 50% Injectable 12.5 Gram(s) IV Push once  dextrose 50% Injectable 25 Gram(s) IV Push once  dextrose 50% Injectable 25 Gram(s) IV Push once  dorzolamide 2% Ophthalmic Solution 1 Drop(s) Both EYES three times a day  collagenase Ointment 1 Application(s) Topical daily  lisinopril 40 milliGRAM(s) Oral daily  timolol 0.5% Solution 1 Drop(s) Both EYES two times a day  brimonidine 0.2% Ophthalmic Solution 1 Drop(s) Both EYES two times a day  amLODIPine   Tablet 10 milliGRAM(s) Oral daily  labetalol 200 milliGRAM(s) Oral three times a day  clindamycin IVPB   IV Intermittent   clindamycin IVPB 600 milliGRAM(s) IV Intermittent every 8 hours    MEDICATIONS  (PRN):  dextrose Gel 1 Dose(s) Oral once PRN Blood Glucose LESS THAN 70 milliGRAM(s)/deciliter  glucagon  Injectable 1 milliGRAM(s) IntraMuscular once PRN Glucose LESS THAN 70 milligrams/deciliter      Allergies    tetanus-diphth toxoids (Td) adult/adol (Unknown)    Intolerances        LABS:                        8.6    10.8  )-----------( 188      ( 01 Jul 2017 10:43 )             27.0     07-01    132<L>  |  95<L>  |  25<H>  ----------------------------<  225<H>  3.5   |  25  |  1.50<H>    Ca    8.0<L>      01 Jul 2017 10:43  Mg     2.0     06-30            RADIOLOGY & ADDITIONAL TESTS:

## 2017-07-01 NOTE — PROGRESS NOTE ADULT - PROBLEM SELECTOR PLAN 4
Right heel ulcer s/p bedside debridement by Podiatry. MRSA.  -clindamycin IV to PO(starting 7/1)  -Podiatry following

## 2017-07-01 NOTE — PROGRESS NOTE ADULT - PROBLEM SELECTOR PLAN 6
Patient has PAD, awaiting collateral from PCP. US no DVT, just edema and cysts  * Vascular consulted, deferred to medicine for management with no interventions

## 2017-07-01 NOTE — PROGRESS NOTE ADULT - PROBLEM SELECTOR PLAN 8
Patient Cr elevated, appears to be acute on chronic with baseline of 1.4  -follow for changes, treating infection with good oral intake  strict i/o  avoid nephrotoxic meds  ok to restart ace  hold diuretics

## 2017-07-01 NOTE — PROGRESS NOTE ADULT - PROBLEM SELECTOR PLAN 3
doubt ACS, suspect demand ischemia due to severe hypertension.   pt w/ non specific t wave changes on ekg  peak trop 0.06 and trended down  pt w/o chest pain

## 2017-07-01 NOTE — PROGRESS NOTE ADULT - SUBJECTIVE AND OBJECTIVE BOX
CC: No pain. Denies cp, sob, n/v/d/c.  No LE pain.  No complaints.  ROS is otherwise negative.    Vital Signs Last 24 Hrs  T(C): 36.8 (01 Jul 2017 08:58), Max: 38.3 (30 Jun 2017 22:37)  T(F): 98.2 (01 Jul 2017 08:58), Max: 100.9 (30 Jun 2017 22:37)  HR: 56 (01 Jul 2017 08:58) (56 - 73)  BP: 156/62 (01 Jul 2017 08:58) (147/54 - 207/87)  BP(mean): --  RR: 18 (01 Jul 2017 08:58) (16 - 18)  SpO2: 96% (01 Jul 2017 08:58) (95% - 97%)    PHYSICAL EXAMINATION  * General: Not in acute distress. Awake and alert. Lying comfortably in bed.  * Head: Normocephalic, atraumatic.  * HEENT: ears no discharge, eyes PERRLA, nose no discharge, throat no exudates, normal tonsils.  * Neck: no JVD, supple.  * Lungs: Clear to auscultation, no rales, no wheezes.  * Cardio: Regular rate and rhythm, no murmurs, no rubs, no gallops. Good peripheral pulses.  * Abdomen: Soft, non-tender, non-distended, tympanic to percussion, no rebound, no guarding, no rigidity. Bowel sounds present. No suprapubic or CVA tenderness.  * : Deferred.  * Extremities: Bi-lateral LE dressing.  * Skin: Warm and dry.  * Neuro: Alert and oriented x 3. No focal deficits. Motor strength is 5/5 throughout. Sensation intact. Cranial nerves II-XII grossly intact.                           8.6    10.8  )-----------( 188      ( 01 Jul 2017 10:43 )             27.0     07-01    132<L>  |  95<L>  |  25<H>  ----------------------------<  225<H>  3.5   |  25  |  1.50<H>    Ca    8.0<L>      01 Jul 2017 10:43  Mg     2.0     06-30    MEDICATIONS  (STANDING):  latanoprost 0.005% Ophthalmic Solution 1 Drop(s) Both EYES at bedtime  heparin  Injectable 7500 Unit(s) SubCutaneous every 8 hours  insulin lispro (HumaLOG) corrective regimen sliding scale   SubCutaneous Before meals and at bedtime  dextrose 5%. 1000 milliLiter(s) (50 mL/Hr) IV Continuous <Continuous>  dextrose 50% Injectable 12.5 Gram(s) IV Push once  dextrose 50% Injectable 25 Gram(s) IV Push once  dextrose 50% Injectable 25 Gram(s) IV Push once  dorzolamide 2% Ophthalmic Solution 1 Drop(s) Both EYES three times a day  collagenase Ointment 1 Application(s) Topical daily  lisinopril 40 milliGRAM(s) Oral daily  timolol 0.5% Solution 1 Drop(s) Both EYES two times a day  brimonidine 0.2% Ophthalmic Solution 1 Drop(s) Both EYES two times a day  amLODIPine   Tablet 10 milliGRAM(s) Oral daily  labetalol 200 milliGRAM(s) Oral three times a day  clindamycin IVPB   IV Intermittent   clindamycin IVPB 600 milliGRAM(s) IV Intermittent every 8 hours    MEDICATIONS  (PRN):  dextrose Gel 1 Dose(s) Oral once PRN Blood Glucose LESS THAN 70 milliGRAM(s)/deciliter  glucagon  Injectable 1 milliGRAM(s) IntraMuscular once PRN Glucose LESS THAN 70 milligrams/deciliter

## 2017-07-01 NOTE — PROGRESS NOTE ADULT - PROBLEM SELECTOR PLAN 7
Patient has PAD, awaiting collateral from PCP. US no DVT, just edema and cysts  -Vascular consulted, deferred to medicine for management with no interventions

## 2017-07-01 NOTE — PROGRESS NOTE ADULT - ASSESSMENT
67yo M, PMH of HTN, DM2, PAD, chronic venous insufficiency, chronic b/l LE edema and cellulitis, BPH, L eye blindness. Admitted for non-healing ulcer on the left lower extremity with associated cellulitis after presenting due to falls.

## 2017-07-01 NOTE — PROGRESS NOTE ADULT - PROBLEM SELECTOR PLAN 1
Growing MRSA and Strep agalactiae.  * Will stop vanc and zosyn.  * Start Clindamycin IV ---> PO, which covers for both bugs.  * Podiatry following.  * Needs LANCE.

## 2017-07-02 LAB
-  AMPICILLIN: SIGNIFICANT CHANGE UP
-  CLINDAMYCIN: SIGNIFICANT CHANGE UP
-  ERYTHROMYCIN: SIGNIFICANT CHANGE UP
-  PENICILLIN: SIGNIFICANT CHANGE UP
-  VANCOMYCIN: SIGNIFICANT CHANGE UP
ANION GAP SERPL CALC-SCNC: 12 MMOL/L — SIGNIFICANT CHANGE UP (ref 5–17)
BUN SERPL-MCNC: 28 MG/DL — HIGH (ref 7–23)
CALCIUM SERPL-MCNC: 8 MG/DL — LOW (ref 8.4–10.5)
CHLORIDE SERPL-SCNC: 97 MMOL/L — SIGNIFICANT CHANGE UP (ref 96–108)
CO2 SERPL-SCNC: 26 MMOL/L — SIGNIFICANT CHANGE UP (ref 22–31)
CREAT SERPL-MCNC: 1.7 MG/DL — HIGH (ref 0.5–1.3)
CULTURE RESULTS: SIGNIFICANT CHANGE UP
CULTURE RESULTS: SIGNIFICANT CHANGE UP
GLUCOSE SERPL-MCNC: 117 MG/DL — HIGH (ref 70–99)
HCT VFR BLD CALC: 26.4 % — LOW (ref 39–50)
HGB BLD-MCNC: 8.1 G/DL — LOW (ref 13–17)
MCHC RBC-ENTMCNC: 23.1 PG — LOW (ref 27–34)
MCHC RBC-ENTMCNC: 30.7 G/DL — LOW (ref 32–36)
MCV RBC AUTO: 75.2 FL — LOW (ref 80–100)
METHOD TYPE: SIGNIFICANT CHANGE UP
ORGANISM # SPEC MICROSCOPIC CNT: SIGNIFICANT CHANGE UP
PLATELET # BLD AUTO: 201 K/UL — SIGNIFICANT CHANGE UP (ref 150–400)
POTASSIUM SERPL-MCNC: 3.7 MMOL/L — SIGNIFICANT CHANGE UP (ref 3.5–5.3)
POTASSIUM SERPL-SCNC: 3.7 MMOL/L — SIGNIFICANT CHANGE UP (ref 3.5–5.3)
RBC # BLD: 3.51 M/UL — LOW (ref 4.2–5.8)
RBC # FLD: 17.2 % — HIGH (ref 10.3–16.9)
SODIUM SERPL-SCNC: 135 MMOL/L — SIGNIFICANT CHANGE UP (ref 135–145)
SPECIMEN SOURCE: SIGNIFICANT CHANGE UP
SPECIMEN SOURCE: SIGNIFICANT CHANGE UP
WBC # BLD: 8.6 K/UL — SIGNIFICANT CHANGE UP (ref 3.8–10.5)
WBC # FLD AUTO: 8.6 K/UL — SIGNIFICANT CHANGE UP (ref 3.8–10.5)

## 2017-07-02 PROCEDURE — 99233 SBSQ HOSP IP/OBS HIGH 50: CPT

## 2017-07-02 RX ADMIN — Medication 450 MILLIGRAM(S): at 22:54

## 2017-07-02 RX ADMIN — HEPARIN SODIUM 7500 UNIT(S): 5000 INJECTION INTRAVENOUS; SUBCUTANEOUS at 15:12

## 2017-07-02 RX ADMIN — Medication 2: at 22:54

## 2017-07-02 RX ADMIN — Medication 200 MILLIGRAM(S): at 15:13

## 2017-07-02 RX ADMIN — DORZOLAMIDE HYDROCHLORIDE 1 DROP(S): 20 SOLUTION/ DROPS OPHTHALMIC at 22:55

## 2017-07-02 RX ADMIN — Medication 200 MILLIGRAM(S): at 06:59

## 2017-07-02 RX ADMIN — BRIMONIDINE TARTRATE 1 DROP(S): 2 SOLUTION/ DROPS OPHTHALMIC at 07:00

## 2017-07-02 RX ADMIN — Medication 100 MILLIGRAM(S): at 06:58

## 2017-07-02 RX ADMIN — Medication 1 DROP(S): at 06:59

## 2017-07-02 RX ADMIN — HEPARIN SODIUM 7500 UNIT(S): 5000 INJECTION INTRAVENOUS; SUBCUTANEOUS at 06:58

## 2017-07-02 RX ADMIN — BRIMONIDINE TARTRATE 1 DROP(S): 2 SOLUTION/ DROPS OPHTHALMIC at 17:54

## 2017-07-02 RX ADMIN — HEPARIN SODIUM 7500 UNIT(S): 5000 INJECTION INTRAVENOUS; SUBCUTANEOUS at 22:54

## 2017-07-02 RX ADMIN — Medication 200 MILLIGRAM(S): at 22:55

## 2017-07-02 RX ADMIN — Medication 1 DROP(S): at 17:55

## 2017-07-02 RX ADMIN — LISINOPRIL 40 MILLIGRAM(S): 2.5 TABLET ORAL at 06:58

## 2017-07-02 RX ADMIN — LATANOPROST 1 DROP(S): 0.05 SOLUTION/ DROPS OPHTHALMIC; TOPICAL at 22:55

## 2017-07-02 RX ADMIN — AMLODIPINE BESYLATE 10 MILLIGRAM(S): 2.5 TABLET ORAL at 06:58

## 2017-07-02 RX ADMIN — Medication 450 MILLIGRAM(S): at 15:13

## 2017-07-02 RX ADMIN — Medication 4: at 12:45

## 2017-07-02 RX ADMIN — DORZOLAMIDE HYDROCHLORIDE 1 DROP(S): 20 SOLUTION/ DROPS OPHTHALMIC at 06:59

## 2017-07-02 RX ADMIN — DORZOLAMIDE HYDROCHLORIDE 1 DROP(S): 20 SOLUTION/ DROPS OPHTHALMIC at 15:13

## 2017-07-02 NOTE — PROGRESS NOTE ADULT - ASSESSMENT
65yo M, PMH of HTN, DM2, PAD, chronic venous insufficiency, chronic b/l LE edema and cellulitis, BPH, L eye blindness. Admitted for non-healing ulcer on the left lower extremity with associated cellulitis after presenting due to falls.

## 2017-07-02 NOTE — PROGRESS NOTE ADULT - SUBJECTIVE AND OBJECTIVE BOX
CC: No pain. Denies cp, sob, n/v/d/c.  No LE pain.  No complaints.  ROS is otherwise negative.    Vital Signs Last 24 Hrs  T(C): 37.3 (02 Jul 2017 10:20), Max: 37.7 (01 Jul 2017 21:10)  T(F): 99.1 (02 Jul 2017 10:20), Max: 99.8 (01 Jul 2017 21:10)  HR: 57 (02 Jul 2017 10:20) (57 - 60)  BP: 131/57 (02 Jul 2017 10:20) (131/57 - 179/74)  BP(mean): --  RR: 16 (02 Jul 2017 10:20) (16 - 18)  SpO2: 97% (02 Jul 2017 10:20) (94% - 97%)    PHYSICAL EXAMINATION  * General: Not in acute distress. Awake and alert. Lying comfortably in bed.  * Head: Normocephalic, atraumatic.  * HEENT: ears no discharge, eyes PERRLA, nose no discharge, throat no exudates, normal tonsils.  * Neck: no JVD, supple.  * Lungs: Clear to auscultation, no rales, no wheezes.  * Cardio: Regular rate and rhythm, no murmurs, no rubs, no gallops. Good peripheral pulses.  * Abdomen: Soft, non-tender, non-distended, tympanic to percussion, no rebound, no guarding, no rigidity. Bowel sounds present. No suprapubic or CVA tenderness.  * : Deferred.  * Extremities: Bi-lateral LE dressing.  * Skin: Warm and dry.  * Neuro: Alert and oriented x 3. No focal deficits. Motor strength is 5/5 throughout. Sensation intact. Cranial nerves II-XII grossly intact.                           8.1    8.6   )-----------( 201      ( 02 Jul 2017 06:11 )             26.4     07-02    135  |  97  |  28<H>  ----------------------------<  117<H>  3.7   |  26  |  1.70<H>    Ca    8.0<L>      02 Jul 2017 06:11    MEDICATIONS  (STANDING):  latanoprost 0.005% Ophthalmic Solution 1 Drop(s) Both EYES at bedtime  heparin  Injectable 7500 Unit(s) SubCutaneous every 8 hours  insulin lispro (HumaLOG) corrective regimen sliding scale   SubCutaneous Before meals and at bedtime  dextrose 5%. 1000 milliLiter(s) (50 mL/Hr) IV Continuous <Continuous>  dextrose 50% Injectable 12.5 Gram(s) IV Push once  dextrose 50% Injectable 25 Gram(s) IV Push once  dextrose 50% Injectable 25 Gram(s) IV Push once  dorzolamide 2% Ophthalmic Solution 1 Drop(s) Both EYES three times a day  collagenase Ointment 1 Application(s) Topical daily  lisinopril 40 milliGRAM(s) Oral daily  timolol 0.5% Solution 1 Drop(s) Both EYES two times a day  brimonidine 0.2% Ophthalmic Solution 1 Drop(s) Both EYES two times a day  amLODIPine   Tablet 10 milliGRAM(s) Oral daily  labetalol 200 milliGRAM(s) Oral three times a day  clindamycin IVPB   IV Intermittent   clindamycin IVPB 600 milliGRAM(s) IV Intermittent every 8 hours    MEDICATIONS  (PRN):  dextrose Gel 1 Dose(s) Oral once PRN Blood Glucose LESS THAN 70 milliGRAM(s)/deciliter  glucagon  Injectable 1 milliGRAM(s) IntraMuscular once PRN Glucose LESS THAN 70 milligrams/deciliter

## 2017-07-02 NOTE — PROGRESS NOTE ADULT - PROBLEM SELECTOR PLAN 3
AS above, improving. WBC trending down to normal limits. No fevers.   * Now on Clindamycin.   * Podiatry recs appreciated.

## 2017-07-02 NOTE — PROGRESS NOTE ADULT - PROBLEM SELECTOR PLAN 1
Growing MRSA and Strep agalactiae.  * Can switch Clinda IV to PO today.  * Podiatry following.  * Needs [facility].

## 2017-07-03 ENCOUNTER — TRANSCRIPTION ENCOUNTER (OUTPATIENT)
Age: 67
End: 2017-07-03

## 2017-07-03 VITALS — WEIGHT: 219.8 LBS

## 2017-07-03 DIAGNOSIS — N18.9 CHRONIC KIDNEY DISEASE, UNSPECIFIED: ICD-10-CM

## 2017-07-03 DIAGNOSIS — E11.9 TYPE 2 DIABETES MELLITUS WITHOUT COMPLICATIONS: ICD-10-CM

## 2017-07-03 LAB
ANION GAP SERPL CALC-SCNC: 11 MMOL/L — SIGNIFICANT CHANGE UP (ref 5–17)
APPEARANCE UR: CLEAR — SIGNIFICANT CHANGE UP
BILIRUB UR-MCNC: NEGATIVE — SIGNIFICANT CHANGE UP
BUN SERPL-MCNC: 30 MG/DL — HIGH (ref 7–23)
CALCIUM SERPL-MCNC: 8.2 MG/DL — LOW (ref 8.4–10.5)
CHLORIDE SERPL-SCNC: 96 MMOL/L — SIGNIFICANT CHANGE UP (ref 96–108)
CO2 SERPL-SCNC: 28 MMOL/L — SIGNIFICANT CHANGE UP (ref 22–31)
COLOR SPEC: YELLOW — SIGNIFICANT CHANGE UP
CREAT ?TM UR-MCNC: 72 MG/DL — SIGNIFICANT CHANGE UP
CREAT SERPL-MCNC: 1.7 MG/DL — HIGH (ref 0.5–1.3)
DIFF PNL FLD: NEGATIVE — SIGNIFICANT CHANGE UP
GLUCOSE SERPL-MCNC: 104 MG/DL — HIGH (ref 70–99)
GLUCOSE UR QL: NEGATIVE — SIGNIFICANT CHANGE UP
HCT VFR BLD CALC: 27.7 % — LOW (ref 39–50)
HGB BLD-MCNC: 8.4 G/DL — LOW (ref 13–17)
KETONES UR-MCNC: NEGATIVE — SIGNIFICANT CHANGE UP
LEUKOCYTE ESTERASE UR-ACNC: NEGATIVE — SIGNIFICANT CHANGE UP
MCHC RBC-ENTMCNC: 23.1 PG — LOW (ref 27–34)
MCHC RBC-ENTMCNC: 30.3 G/DL — LOW (ref 32–36)
MCV RBC AUTO: 76.3 FL — LOW (ref 80–100)
NITRITE UR-MCNC: NEGATIVE — SIGNIFICANT CHANGE UP
OSMOLALITY UR: 334 MOSMOL/KG — SIGNIFICANT CHANGE UP (ref 100–650)
PH UR: 6.5 — SIGNIFICANT CHANGE UP (ref 5–8)
PLATELET # BLD AUTO: 226 K/UL — SIGNIFICANT CHANGE UP (ref 150–400)
POTASSIUM SERPL-MCNC: 4.1 MMOL/L — SIGNIFICANT CHANGE UP (ref 3.5–5.3)
POTASSIUM SERPL-SCNC: 4.1 MMOL/L — SIGNIFICANT CHANGE UP (ref 3.5–5.3)
PROT UR-MCNC: 30 MG/DL
RBC # BLD: 3.63 M/UL — LOW (ref 4.2–5.8)
RBC # FLD: 17.1 % — HIGH (ref 10.3–16.9)
SODIUM SERPL-SCNC: 135 MMOL/L — SIGNIFICANT CHANGE UP (ref 135–145)
SODIUM UR-SCNC: 37 MMOL/L — SIGNIFICANT CHANGE UP
SP GR SPEC: 1.01 — SIGNIFICANT CHANGE UP (ref 1–1.03)
UROBILINOGEN FLD QL: 2 E.U./DL
UUN UR-MCNC: 608 MG/DL — SIGNIFICANT CHANGE UP
WBC # BLD: 6.8 K/UL — SIGNIFICANT CHANGE UP (ref 3.8–10.5)
WBC # FLD AUTO: 6.8 K/UL — SIGNIFICANT CHANGE UP (ref 3.8–10.5)

## 2017-07-03 PROCEDURE — 85025 COMPLETE CBC W/AUTO DIFF WBC: CPT

## 2017-07-03 PROCEDURE — 80053 COMPREHEN METABOLIC PANEL: CPT

## 2017-07-03 PROCEDURE — 87075 CULTR BACTERIA EXCEPT BLOOD: CPT

## 2017-07-03 PROCEDURE — 70450 CT HEAD/BRAIN W/O DYE: CPT

## 2017-07-03 PROCEDURE — 82550 ASSAY OF CK (CPK): CPT

## 2017-07-03 PROCEDURE — 73630 X-RAY EXAM OF FOOT: CPT

## 2017-07-03 PROCEDURE — 82570 ASSAY OF URINE CREATININE: CPT

## 2017-07-03 PROCEDURE — 86803 HEPATITIS C AB TEST: CPT

## 2017-07-03 PROCEDURE — 99285 EMERGENCY DEPT VISIT HI MDM: CPT | Mod: 25

## 2017-07-03 PROCEDURE — 83935 ASSAY OF URINE OSMOLALITY: CPT

## 2017-07-03 PROCEDURE — 85652 RBC SED RATE AUTOMATED: CPT

## 2017-07-03 PROCEDURE — 87184 SC STD DISK METHOD PER PLATE: CPT

## 2017-07-03 PROCEDURE — 81001 URINALYSIS AUTO W/SCOPE: CPT

## 2017-07-03 PROCEDURE — 80048 BASIC METABOLIC PNL TOTAL CA: CPT

## 2017-07-03 PROCEDURE — 85027 COMPLETE CBC AUTOMATED: CPT

## 2017-07-03 PROCEDURE — 36415 COLL VENOUS BLD VENIPUNCTURE: CPT

## 2017-07-03 PROCEDURE — 82553 CREATINE MB FRACTION: CPT

## 2017-07-03 PROCEDURE — 87040 BLOOD CULTURE FOR BACTERIA: CPT

## 2017-07-03 PROCEDURE — 85730 THROMBOPLASTIN TIME PARTIAL: CPT

## 2017-07-03 PROCEDURE — 86140 C-REACTIVE PROTEIN: CPT

## 2017-07-03 PROCEDURE — 87186 SC STD MICRODIL/AGAR DIL: CPT

## 2017-07-03 PROCEDURE — 84300 ASSAY OF URINE SODIUM: CPT

## 2017-07-03 PROCEDURE — 99239 HOSP IP/OBS DSCHRG MGMT >30: CPT

## 2017-07-03 PROCEDURE — 93005 ELECTROCARDIOGRAM TRACING: CPT

## 2017-07-03 PROCEDURE — 87070 CULTURE OTHR SPECIMN AEROBIC: CPT

## 2017-07-03 PROCEDURE — 97162 PT EVAL MOD COMPLEX 30 MIN: CPT

## 2017-07-03 PROCEDURE — 71045 X-RAY EXAM CHEST 1 VIEW: CPT

## 2017-07-03 PROCEDURE — 84540 ASSAY OF URINE/UREA-N: CPT

## 2017-07-03 PROCEDURE — 83036 HEMOGLOBIN GLYCOSYLATED A1C: CPT

## 2017-07-03 PROCEDURE — 83735 ASSAY OF MAGNESIUM: CPT

## 2017-07-03 PROCEDURE — 93970 EXTREMITY STUDY: CPT

## 2017-07-03 PROCEDURE — 85610 PROTHROMBIN TIME: CPT

## 2017-07-03 PROCEDURE — 96374 THER/PROPH/DIAG INJ IV PUSH: CPT

## 2017-07-03 PROCEDURE — 84484 ASSAY OF TROPONIN QUANT: CPT

## 2017-07-03 RX ORDER — LABETALOL HCL 100 MG
1 TABLET ORAL
Qty: 0 | Refills: 0 | COMMUNITY
Start: 2017-07-03

## 2017-07-03 RX ORDER — METRONIDAZOLE 500 MG
1 TABLET ORAL
Qty: 0 | Refills: 0 | COMMUNITY
Start: 2017-07-03 | End: 2017-07-08

## 2017-07-03 RX ORDER — METRONIDAZOLE 500 MG
1 TABLET ORAL
Qty: 15 | Refills: 0 | OUTPATIENT
Start: 2017-07-03 | End: 2017-07-08

## 2017-07-03 RX ORDER — METRONIDAZOLE 500 MG
500 TABLET ORAL EVERY 8 HOURS
Qty: 0 | Refills: 0 | Status: DISCONTINUED | OUTPATIENT
Start: 2017-07-03 | End: 2017-07-03

## 2017-07-03 RX ADMIN — Medication 450 MILLIGRAM(S): at 14:26

## 2017-07-03 RX ADMIN — Medication 200 MILLIGRAM(S): at 06:52

## 2017-07-03 RX ADMIN — HEPARIN SODIUM 7500 UNIT(S): 5000 INJECTION INTRAVENOUS; SUBCUTANEOUS at 06:54

## 2017-07-03 RX ADMIN — AMLODIPINE BESYLATE 10 MILLIGRAM(S): 2.5 TABLET ORAL at 06:53

## 2017-07-03 RX ADMIN — Medication 500 MILLIGRAM(S): at 14:25

## 2017-07-03 RX ADMIN — Medication 200 MILLIGRAM(S): at 14:25

## 2017-07-03 RX ADMIN — HEPARIN SODIUM 7500 UNIT(S): 5000 INJECTION INTRAVENOUS; SUBCUTANEOUS at 14:25

## 2017-07-03 RX ADMIN — DORZOLAMIDE HYDROCHLORIDE 1 DROP(S): 20 SOLUTION/ DROPS OPHTHALMIC at 06:53

## 2017-07-03 RX ADMIN — LISINOPRIL 40 MILLIGRAM(S): 2.5 TABLET ORAL at 06:53

## 2017-07-03 RX ADMIN — Medication 1 DROP(S): at 06:54

## 2017-07-03 RX ADMIN — Medication 1 APPLICATION(S): at 14:26

## 2017-07-03 RX ADMIN — DORZOLAMIDE HYDROCHLORIDE 1 DROP(S): 20 SOLUTION/ DROPS OPHTHALMIC at 14:27

## 2017-07-03 RX ADMIN — Medication 450 MILLIGRAM(S): at 06:52

## 2017-07-03 RX ADMIN — BRIMONIDINE TARTRATE 1 DROP(S): 2 SOLUTION/ DROPS OPHTHALMIC at 06:54

## 2017-07-03 NOTE — DISCHARGE NOTE ADULT - HOSPITAL COURSE
66M PMH HTN, DM2, PAD, venous insufficiency chonic cellulitis that presented after a fall at home.  On admission there was concern for infection of a right heel ulcer and antibiotics were started.  The ulcer was debrided by podiatry and seen by vascular medicine.  Would cultures revealed MRSA, morganella, bacteriodes and Vanc/Zosyn was changed to clindamycin and metronidazole for discharge.  Hospital course was complicated by hypertensive urgency in the 230s systolic.  BP meds were adjusted with the addition of labetalol and stopping diazide and ace-inhibitor(decreased renal function).  Upon discharge patient is hemodynamically stable and all VS are wnl except for mild hypertension in the 140s systolic. 66M PMH HTN, DM2, PAD, venous insufficiency, chonic cellulitis that presented after a fall at home.  On admission there was concern for infection of a right heel ulcer and antibiotics were started.  The ulcer was debrided by podiatry and seen by vascular medicine.  Would cultures revealed MRSA, morganella, bacteriodes and Vanc/Zosyn was changed to clindamycin and metronidazole for discharge.  Hospital course was complicated by hypertensive urgency in the 230s systolic.  BP meds were adjusted with the addition of labetalol and stopping diazide and ace-inhibitor(decreased renal function).  Upon discharge patient is hemodynamically stable and all VS are wnl except for mild hypertension in the 140s systolic.

## 2017-07-03 NOTE — DIETITIAN INITIAL EVALUATION ADULT. - OTHER INFO
F/u with pt to obtain hx ,CKD  skin  chronic venous insuff , DM foot ulcer ,  le cellulitis ,  Blind left eye assisted with menu as needed , le edema ,  no n/v/d/c ,no pain , to f/u with diet hx ,  family not present

## 2017-07-03 NOTE — DISCHARGE NOTE ADULT - PATIENT PORTAL LINK FT
“You can access the [facility] Patient Portal, offered by [facility], by registering with the following website: [email or web address]”

## 2017-07-03 NOTE — PROGRESS NOTE ADULT - PROBLEM SELECTOR PLAN 5
History of falls, likely 2/2 mechanical fall. No symptoms of syncope, cardiac workup negative.    -Vascular consulted and no recs aside from caring for wound and swelling  -PT consulted appreciate rec's Last finger stick at 107  -ISS  -low carb diet

## 2017-07-03 NOTE — PROGRESS NOTE ADULT - PROBLEM SELECTOR PLAN 7
Patient has PAD, awaiting collateral from PCP. US no DVT, just edema and cysts  -Vascular consulted, deferred to medicine for management with no interventions Patient has chronic venous insufficiency. Duplex of LE was negative for DVT  -Vascular on board and no recommended interventions  -follow up echo as outpatient with cardiologist to evaluate because of risk factors including HTN, DM2

## 2017-07-03 NOTE — DISCHARGE NOTE ADULT - PLAN OF CARE
To help improve infection and symptoms You were found to have an infection in your foot(the bacteria are MRSA, bacteriodes, morganella) and are being discharged on the antibiotics Clindamycin and Metronidazole both of which will be taken for 5 days.  Please take your medications as prescribed.  Please follow up with your primary care doctor and any specialists within one week of discharge.  It is vital to have your bandages changed frequently. Wear compression stalkings and keep your legs elevated at times.  The antibiotics you are taking for your foot ulcer will likely help with this problem also. Please follow up with your primary care doctor.  We stopped a medication called an ace-inhibitor because there was concern about your kidney function being lowered Wear compression stalkings and keep your legs elevated Your blood sugars were controlled in the hospital with a sliding scale of insulin.  Please review the list of medications provided and not that you will be continuing your previous medication that you take for diabetes.  Follow a low carbohydrate diet. Continue your prior glaucoma medications Your blood pressure was as high as 230s systolic in the hospital so adjustments were made including adding labetalol and stopping your diazide. Additionally, your ace-inhibitor was stopped because of concern for your kidney function.  Please follow up with your primary care doctor within one week about adjusting your medications Wear compression stalkings and keep your legs elevated. You were seen by the vascular medicine team and they recommended follow up as an outpatient. Your blood sugars were controlled in the hospital with a sliding scale of insulin.  Please review the list of medications provided and not that you will be continuing your previous medication(Glypizide) that you take for diabetes.  Follow a low carbohydrate diet.

## 2017-07-03 NOTE — DISCHARGE NOTE ADULT - CARE PLAN
Principal Discharge DX:	Diabetic ulcer of right heel associated with type 2 diabetes mellitus, unspecified ulcer stage  Goal:	To help improve infection and symptoms  Instructions for follow-up, activity and diet:	You were found to have an infection in your foot(the bacteria are MRSA, bacteriodes, morganella) and are being discharged on the antibiotics Clindamycin and Metronidazole both of which will be taken for 5 days.  Please take your medications as prescribed.  Please follow up with your primary care doctor and any specialists within one week of discharge.  It is vital to have your bandages changed frequently.  Secondary Diagnosis:	Cellulitis of lower extremity, unspecified laterality  Instructions for follow-up, activity and diet:	Wear compression stalkings and keep your legs elevated at times.  The antibiotics you are taking for your foot ulcer will likely help with this problem also.  Secondary Diagnosis:	Chronic kidney disease, unspecified stage  Instructions for follow-up, activity and diet:	Please follow up with your primary care doctor.  We stopped a medication called an ace-inhibitor because there was concern about your kidney function being lowered  Secondary Diagnosis:	Chronic venous insufficiency  Instructions for follow-up, activity and diet:	Wear compression stalkings and keep your legs elevated  Secondary Diagnosis:	DM2 (diabetes mellitus, type 2)  Instructions for follow-up, activity and diet:	Your blood sugars were controlled in the hospital with a sliding scale of insulin.  Please review the list of medications provided and not that you will be continuing your previous medication that you take for diabetes.  Follow a low carbohydrate diet.  Secondary Diagnosis:	Glaucoma  Instructions for follow-up, activity and diet:	Continue your prior glaucoma medications  Secondary Diagnosis:	HTN (hypertension)  Instructions for follow-up, activity and diet:	Your blood pressure was as high as 230s systolic in the hospital so adjustments were made including adding labetalol and stopping your diazide. Additionally, your ace-inhibitor was stopped because of concern for your kidney function.  Please follow up with your primary care doctor within one week about adjusting your medications Principal Discharge DX:	Diabetic ulcer of right heel associated with type 2 diabetes mellitus, unspecified ulcer stage  Goal:	To help improve infection and symptoms  Instructions for follow-up, activity and diet:	You were found to have an infection in your foot(the bacteria are MRSA, bacteriodes, morganella) and are being discharged on the antibiotics Clindamycin and Metronidazole both of which will be taken for 5 days.  Please take your medications as prescribed.  Please follow up with your primary care doctor and any specialists within one week of discharge.  It is vital to have your bandages changed frequently.  Secondary Diagnosis:	Cellulitis of lower extremity, unspecified laterality  Instructions for follow-up, activity and diet:	Wear compression stalkings and keep your legs elevated at times.  The antibiotics you are taking for your foot ulcer will likely help with this problem also.  Secondary Diagnosis:	Chronic kidney disease, unspecified stage  Instructions for follow-up, activity and diet:	Please follow up with your primary care doctor.  We stopped a medication called an ace-inhibitor because there was concern about your kidney function being lowered  Secondary Diagnosis:	Chronic venous insufficiency  Instructions for follow-up, activity and diet:	Wear compression stalkings and keep your legs elevated. You were seen by the vascular medicine team and they recommended follow up as an outpatient.  Secondary Diagnosis:	DM2 (diabetes mellitus, type 2)  Instructions for follow-up, activity and diet:	Your blood sugars were controlled in the hospital with a sliding scale of insulin.  Please review the list of medications provided and not that you will be continuing your previous medication(Glypizide) that you take for diabetes.  Follow a low carbohydrate diet.  Secondary Diagnosis:	Glaucoma  Instructions for follow-up, activity and diet:	Continue your prior glaucoma medications  Secondary Diagnosis:	HTN (hypertension)  Instructions for follow-up, activity and diet:	Your blood pressure was as high as 230s systolic in the hospital so adjustments were made including adding labetalol and stopping your diazide. Additionally, your ace-inhibitor was stopped because of concern for your kidney function.  Please follow up with your primary care doctor within one week about adjusting your medications

## 2017-07-03 NOTE — PROGRESS NOTE ADULT - PROBLEM SELECTOR PLAN 6
Patient has chronic venous insufficiency. Duplex of LE was negative for DVT  -Vascular on board and no recommended interventions  -follow up echo as outpatient with cardiologist to evaluate because of risk factors including HTN, DM2 History of falls, likely 2/2 mechanical fall. No symptoms of syncope, cardiac workup negative.    -Vascular consulted and no recs aside from caring for wound and swelling  -PT consulted appreciate rec's

## 2017-07-03 NOTE — PROGRESS NOTE ADULT - PROBLEM SELECTOR PLAN 2
-currently on clindamycin  -f/u podiatry recs  -bandage changes -currently on clindamycin and flagyl  -f/u podiatry recs  daily dressing changes

## 2017-07-03 NOTE — DISCHARGE NOTE ADULT - MEDICATION SUMMARY - MEDICATIONS TO STOP TAKING
I will STOP taking the medications listed below when I get home from the hospital:    atenolol 50 mg oral tablet  -- 1 tab(s) by mouth once a day    triamterene-hydrochlorothiazide 37.5 mg-25 mg oral capsule  -- 1 cap(s) by mouth once a day    quinapril 40 mg oral tablet  -- 1 tab(s) by mouth once a day

## 2017-07-03 NOTE — DISCHARGE NOTE ADULT - MEDICATION SUMMARY - MEDICATIONS TO TAKE
I will START or STAY ON the medications listed below when I get home from the hospital:    metroNIDAZOLE 500 mg oral tablet  -- 1 tab(s) by mouth every 8 hours  -- Indication: For Infection    glipiZIDE  -- 10  by mouth once a day  -- Indication: For Diabetes     labetalol 200 mg oral tablet  -- 1 tab(s) by mouth 3 times a day  -- Indication: For High blood pressure    amLODIPine 10 mg oral tablet  -- 1 tab(s) by mouth once a day  -- Indication: For High blood pressure    clindamycin 150 mg oral capsule  -- 3 cap(s) by mouth every 8 hours  -- Indication: For infection    Lumigan 0.01% ophthalmic solution  -- 1 drop(s) to each affected eye once a day (in the evening)  -- Indication: For Glaucoma

## 2017-07-03 NOTE — PROGRESS NOTE ADULT - PROVIDER SPECIALTY LIST ADULT
Hospitalist
Internal Medicine
Podiatry
Podiatry
Hospitalist

## 2017-07-03 NOTE — PROGRESS NOTE ADULT - PROBLEM SELECTOR PLAN 3
Cr 1.7 from baseline of 1.4. Appears euvolemic on exam. ACEI is possible cause but was required because of previous hypertensive urgency  -FEUr consistent with intrinsic renal source  -follow Cr

## 2017-07-03 NOTE — PROGRESS NOTE ADULT - PROBLEM SELECTOR PLAN 1
MRSA, morganella, bacteroides. VSS, no clinical symptoms of infection or SIRS  -clindamycin PO(abx started on 6/28), discuss with pharmacy regarding selection  -Podiatry following on deep tissue wound cx (after podiatric debridement) wound cx (+) MRSA, GBS bacteroides   VSS, no clinical symptoms of infection or SIRS  -clindamycin PO(abx started on 6/28), and flagyl (plan for total of 10 day course of abx)  -Podiatry following

## 2017-07-03 NOTE — PROGRESS NOTE ADULT - PROBLEM SELECTOR PLAN 9
History of glaucoma b/l eyes, s/p surgery in right eye, left eye partially blind. Uses ketorolac QID left eye, combigan BID, dorzolamide TID lumigan QHS  -Most medications not on formulary in hospital patient to bring drops for pharmacy here    FEN  -diabetic diet, replete lytes as necessary  PPx  -SQH  Dispo  -RMF to [facility]  FULL CODE

## 2017-07-03 NOTE — PROGRESS NOTE ADULT - PROBLEM SELECTOR PLAN 8
History of glaucoma b/l eyes, s/p surgery in right eye, left eye partially blind. Uses ketorolac QID left eye, combigan BID, dorzolamide TID lumigan QHS  -Most medications not on formulary in hospital patient to bring drops for pharmacy here    FEN  -diabetic diet, replete lytes as necessary  PPx  -SQH  Dispo  -RMF to [facility]  FULL CODE Patient has PAD, awaiting collateral from PCP. US no DVT, just edema and cysts  -Vascular consulted, deferred to medicine for management with no interventions

## 2017-07-03 NOTE — PROGRESS NOTE ADULT - SUBJECTIVE AND OBJECTIVE BOX
INTERVAL HPI/OVERNIGHT EVENTS:    VITAL SIGNS:  T(F): 98.6 (07-03-17 @ 06:11)  HR: 59 (07-03-17 @ 06:11)  BP: 158/65 (07-03-17 @ 06:11)  RR: 20 (07-02-17 @ 22:36)  SpO2: 95% (07-02-17 @ 22:36)  Wt(kg): --      PHYSICAL EXAM:      Constitutional: NAD, well-developed, well-nourished  HEENT: PERRLA, EOMI, no nasal discharge, no pharyngeal erythma  Neck: No lymphadenopathy, No JVD, No carotid bruit  Respiratory: CTAB, no wheezing, no crackles, no rhonchi, no cough  Cardiovascular: S1 and S2, RRR, no M/G/R  Gastrointestinal: BS+, soft, NT/ND  Extremities: No peripheral edema  Vascular: 2+ peripheral pulses  Neurological: A/O x 3, no focal deficits  Psychiatric: Normal mood, normal affect  Musculoskeletal: 5/5 strength b/l upper and lower extremities  Skin: No rashes          MEDICATIONS  (STANDING):  latanoprost 0.005% Ophthalmic Solution 1 Drop(s) Both EYES at bedtime  heparin  Injectable 7500 Unit(s) SubCutaneous every 8 hours  insulin lispro (HumaLOG) corrective regimen sliding scale   SubCutaneous Before meals and at bedtime  dextrose 5%. 1000 milliLiter(s) (50 mL/Hr) IV Continuous <Continuous>  dextrose 50% Injectable 12.5 Gram(s) IV Push once  dextrose 50% Injectable 25 Gram(s) IV Push once  dextrose 50% Injectable 25 Gram(s) IV Push once  dorzolamide 2% Ophthalmic Solution 1 Drop(s) Both EYES three times a day  collagenase Ointment 1 Application(s) Topical daily  lisinopril 40 milliGRAM(s) Oral daily  timolol 0.5% Solution 1 Drop(s) Both EYES two times a day  brimonidine 0.2% Ophthalmic Solution 1 Drop(s) Both EYES two times a day  amLODIPine   Tablet 10 milliGRAM(s) Oral daily  labetalol 200 milliGRAM(s) Oral three times a day  clindamycin   Capsule 450 milliGRAM(s) Oral every 8 hours    MEDICATIONS  (PRN):  dextrose Gel 1 Dose(s) Oral once PRN Blood Glucose LESS THAN 70 milliGRAM(s)/deciliter  glucagon  Injectable 1 milliGRAM(s) IntraMuscular once PRN Glucose LESS THAN 70 milligrams/deciliter      Allergies    tetanus-diphth toxoids (Td) adult/adol (Unknown)    Intolerances        LABS:                        8.4    6.8   )-----------( 226      ( 03 Jul 2017 05:50 )             27.7     07-03    135  |  96  |  30<H>  ----------------------------<  104<H>  4.1   |  28  |  1.70<H>    Ca    8.2<L>      03 Jul 2017 05:49            RADIOLOGY & ADDITIONAL TESTS: INTERVAL HPI/OVERNIGHT EVENTS:  Patient had no acute events overnight with all VS stable, hypertension is at baseline and asymptomatic.  He reports that his pain is significantly improved.      VITAL SIGNS:  T(F): 98.6 (07-03-17 @ 06:11)  HR: 59 (07-03-17 @ 06:11)  BP: 158/65 (07-03-17 @ 06:11)  RR: 20 (07-02-17 @ 22:36)  SpO2: 95% (07-02-17 @ 22:36)  Wt(kg): --      PHYSICAL EXAM:      Constitutional: NAD, well-developed, well-nourished  HEENT: no nasal discharge, no pharyngeal erythma  Neck: No lymphadenopathy, No JVD, No carotid bruit  Respiratory: CTAB, no wheezing, no crackles, no rhonchi, no cough  Cardiovascular: S1 and S2, RRR, no M/G/R  Gastrointestinal: BS+, soft, NT/ND  Extremities: Significant periperal edema in LE with cellulitis, R. heel ulcer bandage intact  Vascular: unable to palpate pulses 2/2 edema  Neurological: A/O x 3, no focal deficits  Psychiatric: Normal mood, normal affect  Musculoskeletal: 5/5 strength b/l upper and lower extremities  Skin: No rashes          MEDICATIONS  (STANDING):  latanoprost 0.005% Ophthalmic Solution 1 Drop(s) Both EYES at bedtime  heparin  Injectable 7500 Unit(s) SubCutaneous every 8 hours  insulin lispro (HumaLOG) corrective regimen sliding scale   SubCutaneous Before meals and at bedtime  dextrose 5%. 1000 milliLiter(s) (50 mL/Hr) IV Continuous <Continuous>  dextrose 50% Injectable 12.5 Gram(s) IV Push once  dextrose 50% Injectable 25 Gram(s) IV Push once  dextrose 50% Injectable 25 Gram(s) IV Push once  dorzolamide 2% Ophthalmic Solution 1 Drop(s) Both EYES three times a day  collagenase Ointment 1 Application(s) Topical daily  lisinopril 40 milliGRAM(s) Oral daily  timolol 0.5% Solution 1 Drop(s) Both EYES two times a day  brimonidine 0.2% Ophthalmic Solution 1 Drop(s) Both EYES two times a day  amLODIPine   Tablet 10 milliGRAM(s) Oral daily  labetalol 200 milliGRAM(s) Oral three times a day  clindamycin   Capsule 450 milliGRAM(s) Oral every 8 hours    MEDICATIONS  (PRN):  dextrose Gel 1 Dose(s) Oral once PRN Blood Glucose LESS THAN 70 milliGRAM(s)/deciliter  glucagon  Injectable 1 milliGRAM(s) IntraMuscular once PRN Glucose LESS THAN 70 milligrams/deciliter      Allergies    tetanus-diphth toxoids (Td) adult/adol (Unknown)    Intolerances        LABS:                        8.4    6.8   )-----------( 226      ( 03 Jul 2017 05:50 )             27.7     07-03    135  |  96  |  30<H>  ----------------------------<  104<H>  4.1   |  28  |  1.70<H>    Ca    8.2<L>      03 Jul 2017 05:49            RADIOLOGY & ADDITIONAL TESTS: INTERVAL HPI/OVERNIGHT EVENTS:  Patient had no acute events overnight with all VS stable, hypertension is at baseline and asymptomatic.  He reports that his leg  pain is significantly improved.      ROS  (- ) headache  ( -  )fevers/chills  ( - ) dyspnea  (  - ) cough  (  - ) chest pain  (  - ) palpatations  ( - ) dizziness/lightheadedness  (  - ) nausea/vomiting  (  - ) abd pain  (  - ) diarrhea  (  - ) melena  (  - ) hematochezia  (  - ) dysuria   ( - ) hematuria     (  - ) motor weakness  ( - ) extremity numbness  ( - ) back pain  ( + ) tolerating POs  ( + ) BM  ROS: 12 point review of systems otherwise negative           VITAL SIGNS:  T(F): 98.6 (07-03-17 @ 06:11)  HR: 59 (07-03-17 @ 06:11)  BP: 158/65 (07-03-17 @ 06:11)  RR: 20 (07-02-17 @ 22:36)  SpO2: 95% (07-02-17 @ 22:36)  Wt(kg): --      PHYSICAL EXAM:      Constitutional: NAD, well-developed, well-nourished  HEENT: no nasal discharge, no pharyngeal erythema  Neck: No lymphadenopathy, No JVD, No carotid bruit  Respiratory: CTAB, no wheezing, no crackles, no rhonchi, no cough  Cardiovascular: S1 and S2, RRR, no M/G/R  Gastrointestinal: BS+, soft, NT/ND  Extremities: 2+ LE edema, w/ tree barking appearance, (+) mild erythema along ankles to proximal lower leg, mild warmth on right leg, R. heel ulcer clean , w/o surrounding erythema/no bleeding or dc.   Vascular:1+ DP pulses bl  Neurological: A/O x 3, blind, otherwise remainder of cn ii-xii intact, 5/5 str all 4 ext,   Psychiatric: Normal mood, normal affect  Musculoskeletal: nml muscle bulk/tone  Skin: No rashes          MEDICATIONS  (STANDING):  latanoprost 0.005% Ophthalmic Solution 1 Drop(s) Both EYES at bedtime  heparin  Injectable 7500 Unit(s) SubCutaneous every 8 hours  insulin lispro (HumaLOG) corrective regimen sliding scale   SubCutaneous Before meals and at bedtime  dextrose 5%. 1000 milliLiter(s) (50 mL/Hr) IV Continuous <Continuous>  dextrose 50% Injectable 12.5 Gram(s) IV Push once  dextrose 50% Injectable 25 Gram(s) IV Push once  dextrose 50% Injectable 25 Gram(s) IV Push once  dorzolamide 2% Ophthalmic Solution 1 Drop(s) Both EYES three times a day  collagenase Ointment 1 Application(s) Topical daily  lisinopril 40 milliGRAM(s) Oral daily  timolol 0.5% Solution 1 Drop(s) Both EYES two times a day  brimonidine 0.2% Ophthalmic Solution 1 Drop(s) Both EYES two times a day  amLODIPine   Tablet 10 milliGRAM(s) Oral daily  labetalol 200 milliGRAM(s) Oral three times a day  clindamycin   Capsule 450 milliGRAM(s) Oral every 8 hours    MEDICATIONS  (PRN):  dextrose Gel 1 Dose(s) Oral once PRN Blood Glucose LESS THAN 70 milliGRAM(s)/deciliter  glucagon  Injectable 1 milliGRAM(s) IntraMuscular once PRN Glucose LESS THAN 70 milligrams/deciliter      Allergies    tetanus-diphth toxoids (Td) adult/adol (Unknown)    Intolerances        LABS:                        8.4    6.8   )-----------( 226      ( 03 Jul 2017 05:50 )             27.7     07-03    135  |  96  |  30<H>  ----------------------------<  104<H>  4.1   |  28  |  1.70<H>    Ca    8.2<L>      03 Jul 2017 05:49            RADIOLOGY & ADDITIONAL TESTS:

## 2017-07-03 NOTE — DISCHARGE NOTE ADULT - SECONDARY DIAGNOSIS.
Cellulitis of lower extremity, unspecified laterality Chronic kidney disease, unspecified stage Chronic venous insufficiency DM2 (diabetes mellitus, type 2) Glaucoma HTN (hypertension)

## 2017-07-03 NOTE — DIETITIAN INITIAL EVALUATION ADULT. - NS AS NUTRI INTERV ED CONTENT2
Other or related topics/Other (specify)/f/u with  patient  , partially sleeping at this time/Purpose of the nutrition education

## 2017-07-03 NOTE — DISCHARGE NOTE ADULT - ADDITIONAL INSTRUCTIONS
Please follow up with your primary care physician and any specialists you see including cardiologist, endocrinologist, podiatrist within one week of discharge. Review all medications that have been changed and take them as prescribed.  If you have any new or concerning symptoms please contact your healthcare providers or go to an emergency room/call 911 Please follow up with your primary care physician and any specialists you see including vascular, cardiologist, endocrinologist, podiatrist within one week of discharge. Review all medications that have been changed and take them as prescribed.  If you have any new or concerning symptoms please contact your healthcare providers or go to an emergency room/call 911

## 2017-07-04 LAB
CULTURE RESULTS: SIGNIFICANT CHANGE UP
CULTURE RESULTS: SIGNIFICANT CHANGE UP
SPECIMEN SOURCE: SIGNIFICANT CHANGE UP
SPECIMEN SOURCE: SIGNIFICANT CHANGE UP

## 2017-07-07 DIAGNOSIS — L03.116 CELLULITIS OF LEFT LOWER LIMB: ICD-10-CM

## 2017-07-07 DIAGNOSIS — Z88.8 ALLERGY STATUS TO OTHER DRUGS, MEDICAMENTS AND BIOLOGICAL SUBSTANCES: ICD-10-CM

## 2017-07-07 DIAGNOSIS — E11.51 TYPE 2 DIABETES MELLITUS WITH DIABETIC PERIPHERAL ANGIOPATHY WITHOUT GANGRENE: ICD-10-CM

## 2017-07-07 DIAGNOSIS — E11.22 TYPE 2 DIABETES MELLITUS WITH DIABETIC CHRONIC KIDNEY DISEASE: ICD-10-CM

## 2017-07-07 DIAGNOSIS — H40.9 UNSPECIFIED GLAUCOMA: ICD-10-CM

## 2017-07-07 DIAGNOSIS — Z83.511 FAMILY HISTORY OF GLAUCOMA: ICD-10-CM

## 2017-07-07 DIAGNOSIS — L03.115 CELLULITIS OF RIGHT LOWER LIMB: ICD-10-CM

## 2017-07-07 DIAGNOSIS — I16.1 HYPERTENSIVE EMERGENCY: ICD-10-CM
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
## 2017-07-07 DIAGNOSIS — D64.9 ANEMIA, UNSPECIFIED: ICD-10-CM

## 2017-07-07 DIAGNOSIS — Z82.49 FAMILY HISTORY OF ISCHEMIC HEART DISEASE AND OTHER DISEASES OF THE CIRCULATORY SYSTEM: ICD-10-CM

## 2017-07-07 DIAGNOSIS — I89.0 LYMPHEDEMA, NOT ELSEWHERE CLASSIFIED: ICD-10-CM

## 2017-07-07 DIAGNOSIS — N40.0 BENIGN PROSTATIC HYPERPLASIA WITHOUT LOWER URINARY TRACT SYMPTOMS: ICD-10-CM

## 2017-07-07 DIAGNOSIS — I87.2 VENOUS INSUFFICIENCY (CHRONIC) (PERIPHERAL): ICD-10-CM

## 2017-07-07 DIAGNOSIS — Z91.81 HISTORY OF FALLING: ICD-10-CM

## 2017-07-07 DIAGNOSIS — B96.6 BACTEROIDES FRAGILIS [B. FRAGILIS] AS THE CAUSE OF DISEASES CLASSIFIED ELSEWHERE: ICD-10-CM

## 2017-07-07 DIAGNOSIS — N17.9 ACUTE KIDNEY FAILURE, UNSPECIFIED: ICD-10-CM

## 2017-07-07 DIAGNOSIS — B96.89 OTHER SPECIFIED BACTERIAL AGENTS AS THE CAUSE OF DISEASES CLASSIFIED ELSEWHERE: ICD-10-CM

## 2017-07-07 DIAGNOSIS — E11.621 TYPE 2 DIABETES MELLITUS WITH FOOT ULCER: ICD-10-CM

## 2017-07-07 DIAGNOSIS — I12.9 HYPERTENSIVE CHRONIC KIDNEY DISEASE WITH STAGE 1 THROUGH STAGE 4 CHRONIC KIDNEY DISEASE, OR UNSPECIFIED CHRONIC KIDNEY DISEASE: ICD-10-CM

## 2017-07-07 DIAGNOSIS — N18.9 CHRONIC KIDNEY DISEASE, UNSPECIFIED: ICD-10-CM

## 2017-07-07 DIAGNOSIS — L97.419 NON-PRESSURE CHRONIC ULCER OF RIGHT HEEL AND MIDFOOT WITH UNSPECIFIED SEVERITY: ICD-10-CM

## 2017-07-07 DIAGNOSIS — B95.62 METHICILLIN RESISTANT STAPHYLOCOCCUS AUREUS INFECTION AS THE CAUSE OF DISEASES CLASSIFIED ELSEWHERE: ICD-10-CM

## 2017-07-07 DIAGNOSIS — Z79.84 LONG TERM (CURRENT) USE OF ORAL HYPOGLYCEMIC DRUGS: ICD-10-CM

## 2017-07-07 DIAGNOSIS — Z87.891 PERSONAL HISTORY OF NICOTINE DEPENDENCE: ICD-10-CM

## 2017-07-07 DIAGNOSIS — L02.611 CUTANEOUS ABSCESS OF RIGHT FOOT: ICD-10-CM

## 2017-11-08 ENCOUNTER — RX RENEWAL (OUTPATIENT)
Age: 67
End: 2017-11-08

## 2018-03-10 NOTE — ED ADULT NURSE NOTE - OBJECTIVE STATEMENT
3/10/2018  Re: Yamila Barnes   [address]  [facility] [address]         To whom it may concern:    Ms. Barnes should remain off work for medical reasons.    May return to usual work duties on 3/12/2018.    Restrictions: none    Thank you very much.     Sincerely,        Dr. Evelyn Reyna  [facility]-[address] [address]  [phone]            
67 y/o male c/o fall today no LOC +head injury to the back of the head. right leg pain and headache s/p trip and fall while going up the stairs. not on any blood thinners. pts legs chronically swollen, red, inflamed, sees a vascular doctor. PMH DM. unstagable ulcer on the bottom right heel. a&ox3, speaking in full sentences, VS stable.

## 2018-04-13 ENCOUNTER — INPATIENT (INPATIENT)
Facility: HOSPITAL | Age: 68
LOS: 3 days | Discharge: NURSING, REHAB OR LONG-TERM CARE | DRG: 291 | End: 2018-04-17
Attending: INTERNAL MEDICINE | Admitting: INTERNAL MEDICINE
Payer: MEDICARE

## 2018-04-13 VITALS
RESPIRATION RATE: 18 BRPM | TEMPERATURE: 98 F | HEART RATE: 90 BPM | OXYGEN SATURATION: 97 % | DIASTOLIC BLOOD PRESSURE: 78 MMHG | SYSTOLIC BLOOD PRESSURE: 193 MMHG

## 2018-04-13 DIAGNOSIS — E11.9 TYPE 2 DIABETES MELLITUS WITHOUT COMPLICATIONS: ICD-10-CM

## 2018-04-13 DIAGNOSIS — I10 ESSENTIAL (PRIMARY) HYPERTENSION: ICD-10-CM

## 2018-04-13 DIAGNOSIS — H40.9 UNSPECIFIED GLAUCOMA: ICD-10-CM

## 2018-04-13 DIAGNOSIS — Z98.89 OTHER SPECIFIED POSTPROCEDURAL STATES: Chronic | ICD-10-CM

## 2018-04-13 DIAGNOSIS — N18.9 CHRONIC KIDNEY DISEASE, UNSPECIFIED: ICD-10-CM

## 2018-04-13 DIAGNOSIS — I87.2 VENOUS INSUFFICIENCY (CHRONIC) (PERIPHERAL): ICD-10-CM

## 2018-04-13 DIAGNOSIS — I48.91 UNSPECIFIED ATRIAL FIBRILLATION: ICD-10-CM

## 2018-04-13 DIAGNOSIS — N18.4 CHRONIC KIDNEY DISEASE, STAGE 4 (SEVERE): ICD-10-CM

## 2018-04-13 DIAGNOSIS — E87.70 FLUID OVERLOAD, UNSPECIFIED: ICD-10-CM

## 2018-04-13 DIAGNOSIS — N40.0 BENIGN PROSTATIC HYPERPLASIA WITHOUT LOWER URINARY TRACT SYMPTOMS: ICD-10-CM

## 2018-04-13 DIAGNOSIS — E87.8 OTHER DISORDERS OF ELECTROLYTE AND FLUID BALANCE, NOT ELSEWHERE CLASSIFIED: ICD-10-CM

## 2018-04-13 LAB
ALBUMIN SERPL ELPH-MCNC: 3.6 G/DL — SIGNIFICANT CHANGE UP (ref 3.3–5)
ALP SERPL-CCNC: 60 U/L — SIGNIFICANT CHANGE UP (ref 40–120)
ALT FLD-CCNC: 8 U/L — LOW (ref 10–45)
ANION GAP SERPL CALC-SCNC: 12 MMOL/L — SIGNIFICANT CHANGE UP (ref 5–17)
APPEARANCE UR: CLEAR — SIGNIFICANT CHANGE UP
APTT BLD: 29.2 SEC — SIGNIFICANT CHANGE UP (ref 27.5–37.4)
AST SERPL-CCNC: 15 U/L — SIGNIFICANT CHANGE UP (ref 10–40)
BASOPHILS NFR BLD AUTO: 0.6 % — SIGNIFICANT CHANGE UP (ref 0–2)
BILIRUB SERPL-MCNC: 0.5 MG/DL — SIGNIFICANT CHANGE UP (ref 0.2–1.2)
BILIRUB UR-MCNC: NEGATIVE — SIGNIFICANT CHANGE UP
BUN SERPL-MCNC: 31 MG/DL — HIGH (ref 7–23)
CALCIUM SERPL-MCNC: 8.2 MG/DL — LOW (ref 8.4–10.5)
CHLORIDE SERPL-SCNC: 102 MMOL/L — SIGNIFICANT CHANGE UP (ref 96–108)
CHOLEST SERPL-MCNC: 163 MG/DL — SIGNIFICANT CHANGE UP (ref 10–199)
CO2 SERPL-SCNC: 25 MMOL/L — SIGNIFICANT CHANGE UP (ref 22–31)
COLOR SPEC: YELLOW — SIGNIFICANT CHANGE UP
CREAT SERPL-MCNC: 2.31 MG/DL — HIGH (ref 0.5–1.3)
DIFF PNL FLD: (no result)
EOSINOPHIL NFR BLD AUTO: 2.4 % — SIGNIFICANT CHANGE UP (ref 0–6)
GLUCOSE BLDC GLUCOMTR-MCNC: 117 MG/DL — HIGH (ref 70–99)
GLUCOSE BLDC GLUCOMTR-MCNC: 133 MG/DL — HIGH (ref 70–99)
GLUCOSE BLDC GLUCOMTR-MCNC: 152 MG/DL — HIGH (ref 70–99)
GLUCOSE SERPL-MCNC: 222 MG/DL — HIGH (ref 70–99)
GLUCOSE UR QL: 100
HBA1C BLD-MCNC: 5.9 % — HIGH (ref 4–5.6)
HCT VFR BLD CALC: 31.1 % — LOW (ref 39–50)
HDLC SERPL-MCNC: 67 MG/DL — SIGNIFICANT CHANGE UP (ref 40–125)
HGB BLD-MCNC: 9.9 G/DL — LOW (ref 13–17)
INR BLD: 1.14 — SIGNIFICANT CHANGE UP (ref 0.88–1.16)
KETONES UR-MCNC: NEGATIVE — SIGNIFICANT CHANGE UP
LEUKOCYTE ESTERASE UR-ACNC: NEGATIVE — SIGNIFICANT CHANGE UP
LIPID PNL WITH DIRECT LDL SERPL: 84 MG/DL — SIGNIFICANT CHANGE UP
LYMPHOCYTES # BLD AUTO: 14.6 % — SIGNIFICANT CHANGE UP (ref 13–44)
MCHC RBC-ENTMCNC: 26.2 PG — LOW (ref 27–34)
MCHC RBC-ENTMCNC: 31.8 G/DL — LOW (ref 32–36)
MCV RBC AUTO: 82.3 FL — SIGNIFICANT CHANGE UP (ref 80–100)
MONOCYTES NFR BLD AUTO: 6.7 % — SIGNIFICANT CHANGE UP (ref 2–14)
NEUTROPHILS NFR BLD AUTO: 75.7 % — SIGNIFICANT CHANGE UP (ref 43–77)
NITRITE UR-MCNC: NEGATIVE — SIGNIFICANT CHANGE UP
NT-PROBNP SERPL-SCNC: HIGH PG/ML (ref 0–300)
PH UR: 6 — SIGNIFICANT CHANGE UP (ref 5–8)
PLATELET # BLD AUTO: 133 K/UL — LOW (ref 150–400)
POTASSIUM SERPL-MCNC: 3.7 MMOL/L — SIGNIFICANT CHANGE UP (ref 3.5–5.3)
POTASSIUM SERPL-SCNC: 3.7 MMOL/L — SIGNIFICANT CHANGE UP (ref 3.5–5.3)
PROT SERPL-MCNC: 7 G/DL — SIGNIFICANT CHANGE UP (ref 6–8.3)
PROT UR-MCNC: >=300 MG/DL
PROTHROM AB SERPL-ACNC: 12.7 SEC — SIGNIFICANT CHANGE UP (ref 9.8–12.7)
RBC # BLD: 3.78 M/UL — LOW (ref 4.2–5.8)
RBC # FLD: 16.2 % — SIGNIFICANT CHANGE UP (ref 10.3–16.9)
SODIUM SERPL-SCNC: 139 MMOL/L — SIGNIFICANT CHANGE UP (ref 135–145)
SP GR SPEC: >=1.03 — SIGNIFICANT CHANGE UP (ref 1–1.03)
TOTAL CHOLESTEROL/HDL RATIO MEASUREMENT: 2.4 RATIO — LOW (ref 3.4–9.6)
TRIGL SERPL-MCNC: 59 MG/DL — SIGNIFICANT CHANGE UP (ref 10–149)
URATE SERPL-MCNC: 6.6 — SIGNIFICANT CHANGE UP
UROBILINOGEN FLD QL: 0.2 E.U./DL — SIGNIFICANT CHANGE UP
WBC # BLD: 6.3 K/UL — SIGNIFICANT CHANGE UP (ref 3.8–10.5)
WBC # FLD AUTO: 6.3 K/UL — SIGNIFICANT CHANGE UP (ref 3.8–10.5)

## 2018-04-13 PROCEDURE — 93010 ELECTROCARDIOGRAM REPORT: CPT

## 2018-04-13 PROCEDURE — 99233 SBSQ HOSP IP/OBS HIGH 50: CPT | Mod: GC

## 2018-04-13 PROCEDURE — 99285 EMERGENCY DEPT VISIT HI MDM: CPT

## 2018-04-13 PROCEDURE — 93970 EXTREMITY STUDY: CPT | Mod: 26

## 2018-04-13 PROCEDURE — 71046 X-RAY EXAM CHEST 2 VIEWS: CPT | Mod: 26

## 2018-04-13 PROCEDURE — 93306 TTE W/DOPPLER COMPLETE: CPT | Mod: 26

## 2018-04-13 PROCEDURE — 99223 1ST HOSP IP/OBS HIGH 75: CPT | Mod: GC

## 2018-04-13 RX ORDER — PREGABALIN 225 MG/1
1 CAPSULE ORAL
Qty: 0 | Refills: 0 | COMMUNITY

## 2018-04-13 RX ORDER — PREGABALIN 225 MG/1
1000 CAPSULE ORAL DAILY
Qty: 0 | Refills: 0 | Status: DISCONTINUED | OUTPATIENT
Start: 2018-04-13 | End: 2018-04-17

## 2018-04-13 RX ORDER — DEXTROSE 50 % IN WATER 50 %
1 SYRINGE (ML) INTRAVENOUS ONCE
Qty: 0 | Refills: 0 | Status: DISCONTINUED | OUTPATIENT
Start: 2018-04-13 | End: 2018-04-17

## 2018-04-13 RX ORDER — DEXTROSE 50 % IN WATER 50 %
12.5 SYRINGE (ML) INTRAVENOUS ONCE
Qty: 0 | Refills: 0 | Status: DISCONTINUED | OUTPATIENT
Start: 2018-04-13 | End: 2018-04-17

## 2018-04-13 RX ORDER — AMLODIPINE BESYLATE 2.5 MG/1
10 TABLET ORAL DAILY
Qty: 0 | Refills: 0 | Status: DISCONTINUED | OUTPATIENT
Start: 2018-04-13 | End: 2018-04-16

## 2018-04-13 RX ORDER — DEXTROSE 50 % IN WATER 50 %
25 SYRINGE (ML) INTRAVENOUS ONCE
Qty: 0 | Refills: 0 | Status: DISCONTINUED | OUTPATIENT
Start: 2018-04-13 | End: 2018-04-17

## 2018-04-13 RX ORDER — AMLODIPINE BESYLATE 2.5 MG/1
5 TABLET ORAL ONCE
Qty: 0 | Refills: 0 | Status: COMPLETED | OUTPATIENT
Start: 2018-04-13 | End: 2018-04-13

## 2018-04-13 RX ORDER — HYDRALAZINE HCL 50 MG
10 TABLET ORAL ONCE
Qty: 0 | Refills: 0 | Status: COMPLETED | OUTPATIENT
Start: 2018-04-13 | End: 2018-04-13

## 2018-04-13 RX ORDER — ATENOLOL 25 MG/1
25 TABLET ORAL ONCE
Qty: 0 | Refills: 0 | Status: COMPLETED | OUTPATIENT
Start: 2018-04-13 | End: 2018-04-13

## 2018-04-13 RX ORDER — LISINOPRIL 2.5 MG/1
1 TABLET ORAL
Qty: 0 | Refills: 0 | COMMUNITY

## 2018-04-13 RX ORDER — BRIMONIDINE TARTRATE, TIMOLOL MALEATE 2; 5 MG/ML; MG/ML
1 SOLUTION/ DROPS OPHTHALMIC
Qty: 0 | Refills: 0 | COMMUNITY

## 2018-04-13 RX ORDER — FUROSEMIDE 40 MG
80 TABLET ORAL ONCE
Qty: 0 | Refills: 0 | Status: COMPLETED | OUTPATIENT
Start: 2018-04-13 | End: 2018-04-13

## 2018-04-13 RX ORDER — AMLODIPINE BESYLATE 2.5 MG/1
5 TABLET ORAL DAILY
Qty: 0 | Refills: 0 | Status: DISCONTINUED | OUTPATIENT
Start: 2018-04-13 | End: 2018-04-14

## 2018-04-13 RX ORDER — DOXAZOSIN MESYLATE 4 MG
1 TABLET ORAL
Qty: 0 | Refills: 0 | COMMUNITY

## 2018-04-13 RX ORDER — APIXABAN 2.5 MG/1
1 TABLET, FILM COATED ORAL
Qty: 0 | Refills: 0 | COMMUNITY

## 2018-04-13 RX ORDER — HYDRALAZINE HCL 50 MG
25 TABLET ORAL EVERY 8 HOURS
Qty: 0 | Refills: 0 | Status: DISCONTINUED | OUTPATIENT
Start: 2018-04-13 | End: 2018-04-14

## 2018-04-13 RX ORDER — DOXAZOSIN MESYLATE 4 MG
2 TABLET ORAL DAILY
Qty: 0 | Refills: 0 | Status: DISCONTINUED | OUTPATIENT
Start: 2018-04-13 | End: 2018-04-17

## 2018-04-13 RX ORDER — DORZOLAMIDE HYDROCHLORIDE 20 MG/ML
1 SOLUTION/ DROPS OPHTHALMIC THREE TIMES A DAY
Qty: 0 | Refills: 0 | Status: DISCONTINUED | OUTPATIENT
Start: 2018-04-13 | End: 2018-04-17

## 2018-04-13 RX ORDER — SODIUM CHLORIDE 9 MG/ML
1000 INJECTION, SOLUTION INTRAVENOUS
Qty: 0 | Refills: 0 | Status: DISCONTINUED | OUTPATIENT
Start: 2018-04-13 | End: 2018-04-17

## 2018-04-13 RX ORDER — DORZOLAMIDE HYDROCHLORIDE 20 MG/ML
1 SOLUTION/ DROPS OPHTHALMIC
Qty: 0 | Refills: 0 | COMMUNITY

## 2018-04-13 RX ORDER — GLUCAGON INJECTION, SOLUTION 0.5 MG/.1ML
1 INJECTION, SOLUTION SUBCUTANEOUS ONCE
Qty: 0 | Refills: 0 | Status: DISCONTINUED | OUTPATIENT
Start: 2018-04-13 | End: 2018-04-17

## 2018-04-13 RX ORDER — LISINOPRIL 2.5 MG/1
40 TABLET ORAL DAILY
Qty: 0 | Refills: 0 | Status: DISCONTINUED | OUTPATIENT
Start: 2018-04-13 | End: 2018-04-17

## 2018-04-13 RX ORDER — INSULIN LISPRO 100/ML
VIAL (ML) SUBCUTANEOUS
Qty: 0 | Refills: 0 | Status: DISCONTINUED | OUTPATIENT
Start: 2018-04-13 | End: 2018-04-17

## 2018-04-13 RX ORDER — LISINOPRIL 2.5 MG/1
40 TABLET ORAL ONCE
Qty: 0 | Refills: 0 | Status: COMPLETED | OUTPATIENT
Start: 2018-04-13 | End: 2018-04-13

## 2018-04-13 RX ORDER — HYDRALAZINE HCL 50 MG
10 TABLET ORAL EVERY 6 HOURS
Qty: 0 | Refills: 0 | Status: DISCONTINUED | OUTPATIENT
Start: 2018-04-13 | End: 2018-04-14

## 2018-04-13 RX ORDER — APIXABAN 2.5 MG/1
5 TABLET, FILM COATED ORAL EVERY 12 HOURS
Qty: 0 | Refills: 0 | Status: DISCONTINUED | OUTPATIENT
Start: 2018-04-13 | End: 2018-04-17

## 2018-04-13 RX ORDER — LABETALOL HCL 100 MG
10 TABLET ORAL ONCE
Qty: 0 | Refills: 0 | Status: COMPLETED | OUTPATIENT
Start: 2018-04-13 | End: 2018-04-13

## 2018-04-13 RX ORDER — ATENOLOL 25 MG/1
1 TABLET ORAL
Qty: 0 | Refills: 0 | COMMUNITY

## 2018-04-13 RX ADMIN — LISINOPRIL 40 MILLIGRAM(S): 2.5 TABLET ORAL at 09:09

## 2018-04-13 RX ADMIN — Medication 25 MILLIGRAM(S): at 22:37

## 2018-04-13 RX ADMIN — AMLODIPINE BESYLATE 5 MILLIGRAM(S): 2.5 TABLET ORAL at 20:40

## 2018-04-13 RX ADMIN — AMLODIPINE BESYLATE 5 MILLIGRAM(S): 2.5 TABLET ORAL at 18:26

## 2018-04-13 RX ADMIN — DORZOLAMIDE HYDROCHLORIDE 1 DROP(S): 20 SOLUTION/ DROPS OPHTHALMIC at 22:38

## 2018-04-13 RX ADMIN — APIXABAN 5 MILLIGRAM(S): 2.5 TABLET, FILM COATED ORAL at 18:26

## 2018-04-13 RX ADMIN — Medication 10 MILLIGRAM(S): at 17:09

## 2018-04-13 RX ADMIN — Medication 10 MILLIGRAM(S): at 21:19

## 2018-04-13 RX ADMIN — LISINOPRIL 40 MILLIGRAM(S): 2.5 TABLET ORAL at 18:26

## 2018-04-13 RX ADMIN — Medication 2: at 22:37

## 2018-04-13 RX ADMIN — Medication 25 MILLIGRAM(S): at 20:40

## 2018-04-13 RX ADMIN — ATENOLOL 25 MILLIGRAM(S): 25 TABLET ORAL at 09:12

## 2018-04-13 RX ADMIN — Medication 10 MILLIGRAM(S): at 19:49

## 2018-04-13 RX ADMIN — Medication 80 MILLIGRAM(S): at 11:48

## 2018-04-13 NOTE — ED ADULT NURSE NOTE - CHPI ED SYMPTOMS NEG
no weakness/no numbness/no pain/no tingling/no chills/no nausea/no decreased eating/drinking/no vomiting/no dizziness/no fever

## 2018-04-13 NOTE — H&P ADULT - NSHPLABSRESULTS_GEN_ALL_CORE
9.9    6.3   )-----------( 133      ( 13 Apr 2018 08:50 )             31.1   04-13    139  |  102  |  31<H>  ----------------------------<  222<H>  3.7   |  25  |  2.31<H>    Ca    8.2<L>      13 Apr 2018 08:50    TPro  7.0  /  Alb  3.6  /  TBili  0.5  /  DBili  x   /  AST  15  /  ALT  8<L>  /  AlkPhos  60  04-13  PT/INR - ( 13 Apr 2018 08:50 )   PT: 12.7 sec;   INR: 1.14          PTT - ( 13 Apr 2018 08:50 )  PTT:29.2 sec

## 2018-04-13 NOTE — H&P ADULT - PROBLEM SELECTOR PLAN 2
- Patient has h/o chronic venous insufficiency.   - Duplex of LE 4/13/18  was negative for DVT above knees, b/l enlarged inguinal lymph nodes, b/l popliteal cyst, paired peroneal and posterior tibial calf veins were not visualized 2/2 to overlying soft tissue edema.  - - Rate controlled @ 70-80 bpm  - home Eliquis 2.5 mg BID (last dose month ago) increased to eliquis 5 mg BID as d/w Dr. Blackman

## 2018-04-13 NOTE — H&P ADULT - ASSESSMENT
66 y/o M NON- COMPLIANT WITH MEDS/POOR MEDICAL F/U with PMH of HTN, DM with peripheral nephropathy, CKD stage 4 ( Cr 2.31 today; baseline cr 1.7), PAD, chronic venous insufficiency, chronic cellulitis , chronic B/L LE edema,  + MRSA right foot ulcer(June 2017; tx with antibiotics), BPH, glaucoma in b/l eyes who is now admitted to 5 uris for further management of B/L LE edema. 68 y/o M NON- COMPLIANT WITH MEDS/POOR MEDICAL F/U with PMH of  afib (on eliquis; last dose a month ago) HTN, DM with peripheral nephropathy, CKD stage 4 ( Cr 2.31 today; baseline cr 1.7), PAD, chronic venous insufficiency, chronic cellulitis , chronic B/L LE edema,  + MRSA right foot ulcer(June 2017; tx with antibiotics), BPH, glaucoma in b/l eyes who is now admitted to 5 uris for further management of B/L LE edema.

## 2018-04-13 NOTE — CONSULT NOTE ADULT - SUBJECTIVE AND OBJECTIVE BOX
INCOMPLETE NOET     Patient is a 67y Male admitted for bilateral leg edema    The patient has a known history of CKD3/4 with presumed etiology due to:  A review of [facility] has these recent Creatinine trends available for our review:  2018 Blood Urea Nitrogen, Serum 71 mg/dL 7-23 H [facility]2018 Creatinine, Serum 2.03 mg/dL 0.50-1.30 H [facility])2018 eGFR if Non African American 32 mL/min/1.73M2 >=60 L [facility]2018 eGFR if  38 mL/min/1.73M2 >=60 L [facility])  2018 Creatinine, Serum 1.81 mg/dL 0.50-1.30 H [facility])  2018 Blood Urea Nitrogen, Serum 48 mg/dL 7-23 H [facility]2018 eGFR if African American 43 mL/min/1.73M2 >=60 L [facility]2018 eGFR if Non African American 37 mL/min/1.73M2 >=60 L [facility])  01/10/2018 Blood Urea Nitrogen 54 mg/dL 8-20 H (NA)  01/10/2018 Creatinine 1.90 mg/dL 0.64-1.27 H (NA)  01/10/2018 GFR African-American 43 mL/min/1.73 m2 >=60 L (NA)  01/10/2018 GFR Non African-American 35 mL/min/1.73 m2 >=60 L (NA)  2018 Blood Urea Nitrogen 72 mg/dL 8-20 H (NA)  2018 Creatinine 1.98 mg/dL 0.64-1.27 H (NA)  2018 Blood Urea Nitrogen 78 mg/dL 8-20 H (NA)  2018 Creatinine 2.10 mg/dL 0.64-1.27 H (NA)  2018 Blood Urea Nitrogen, Serum 80 mg/dL 7-23 H [facility]2018 Creatinine, Serum 2.50 mg/dL 0.50-1.30 H [facility]  2018 Blood Urea Nitrogen, Serum 71 mg/dL 7-23 H [facility]2018 Creatinine, Serum 2.32 mg/dL 0.50-1.30 H [facility]2018 eGFR if Non African American 28 mL/min/1.73M2 >=60 L [facility]2018 eGFR if African American 32 mL/min/1.73M2 >=60 L [facility])  2018 Blood Urea Nitrogen, Serum 70 mg/dL 7-23 H [facility])  2018 Creatinine, Serum 2.43 mg/dL 0.50-1.30 H [facility])  2017 Blood Urea Nitrogen, Serum 46 mg/dL 7-23 H [facility])  2017 Creatinine, Serum 2.07 mg/dL 0.50-1.30 H [facility])  12/15/2017 Blood Urea Nitrogen, Serum 39 mg/dL 7-23 H [facility])  12/15/2017 Creatinine, Serum 1.87 mg/dL 0.50-1.30 H [facility])  2017 Creatinine 1.78 mg/dL 0.64-1.27 H (NA)  2017 Creatinine 1.79 mg/dL 0.64-1.27 H (NA)  2017 Creatinine 1.81 mg/dL 0.64-1.27 H (NA)  2017 Creatinine 1.92 mg/dL 0.64-1.27 H (NA)  2017 Creatinine 1.73 mg/dL 0.64-1.27 H (NA)  2017 Creatinine 1.36 mg/dL 0.64-1.27 H (NA)  2017 Creatinine, Serum 1.50 mg/dL 0.50-1.30 H [facility] (Hospital)    These values over time seem to suggest progression of disease.    A summary of his history, derived from the primary team history, is:    66 y/o M NON- COMPLIANT WITH MEDS/POOR MEDICAL F/U with PMH of Afib (on eliquis ; last dose a month ago) HTN, DM with peripheral nephropathy, CKD stage 4 ( Cr 2.31 today; baseline cr 1.7), PAD, chronic venous insufficiency, chronic cellulitis , chronic B/L LE edema,  + MRSA right foot ulcer(2017; tx with antibiotics), BPH, poor vision in b/l eyes 2/2 glaucoma who now presents to [facility] ED c/o worsening B/L LE swelling X 1 week. Pt. reports h/o similar B/L LE swelling and was admitted in oct 2017 @ [facility] for chronic venous insufficiency and d/c to Rehab then.  Pt. reports that his swelling had improved after being d/c from rehab in 2018. However, pt. reports that his medicine had ran out a month ago. Since then, he reports not taking any medication except his eye drops. Pt. reports that over the past month since being non- compliant with meds, his swelling in his legs were getting bad. However, in the past week, his swelling got so worse that he was unable to ambulate which prompted him to come to the ER today. He denies any CP, SOB, dizziness, diaphoresis, fatigue, LE edema, palpitations, orthopnea (sleeps on 1 pillow), PND, syncope. In ER /113 , HR, 90 RR 18, T 36.9, O2 97 % RA. Pt received  Atenolol 25 ,g PO X 1, Lisinopril 40 mg PO X 1, Lasix 80 mg IV X 1. Repeat /82. Labs significant for BUN/Cr 31/2.31, BNP . CXR: revealed cardiomegaly as before and b/l small pleural effusion. Doppler B/L LE revealed no DVT above knees, b/l enlarged inguinal lymph nodes, b/l popliteal cyst, paired peroneal and posterior tibial calf veins were not visualized 2/2 to overlying soft tissue edema. Pt admitted to [facility] for further management.  EKG performed in [facility] revealed afib @ 72 bpm with PVCs.  Of note: f/u with Dr. Ford who he hasn’t seen for a year; as per Dr. Liu; EF normal in echo 2016.  he was supposed to f/u vascular which he never did.     Further history taken by myself is:          PAST MEDICAL & SURGICAL HISTORY:  Falls  BPH (benign prostatic hypertrophy)  Chronic venous insufficiency  Glaucoma  Peripheral arterial disease  Kidney stone  DM (diabetes mellitus)  HTN (hypertension)  H/O eye surgery      MEDICATIONS  (STANDING):  amLODIPine   Tablet 5 milliGRAM(s) Oral daily  apixaban 5 milliGRAM(s) Oral every 12 hours  cyanocobalamin 1000 MICROGram(s) Oral daily  dextrose 5%. 1000 milliLiter(s) (50 mL/Hr) IV Continuous <Continuous>  dextrose 50% Injectable 12.5 Gram(s) IV Push once  dextrose 50% Injectable 25 Gram(s) IV Push once  dextrose 50% Injectable 25 Gram(s) IV Push once  dorzolamide 2% Ophthalmic Solution 1 Drop(s) Both EYES three times a day  doxazosin 2 milliGRAM(s) Oral daily  insulin lispro (HumaLOG) corrective regimen sliding scale   SubCutaneous Before meals and at bedtime  lisinopril 40 milliGRAM(s) Oral daily    MEDICATIONS  (PRN):  dextrose Gel 1 Dose(s) Oral once PRN Blood Glucose LESS THAN 70 milliGRAM(s)/deciliter  glucagon  Injectable 1 milliGRAM(s) IntraMuscular once PRN Glucose LESS THAN 70 milligrams/deciliter      Allergies    tetanus-diphth toxoids (Td) adult/adol (Unknown)    Intolerances        SOCIAL HISTORY:    FAMILY HISTORY:  Family history of hypertension (Father, Mother)  Family history of glaucoma (Father, Mother)      T(C): , Max: 36.9 (18 @ 08:11)  T(F): , Max: 98.5 (18 @ 08:35)  HR: 90 (18 @ 14:54)  BP: 193/78 (18 @ 14:54)  BP(mean): 116 (18 @ 14:54)  RR: 16 (18 @ 14:54)  SpO2: 97% (18 @ 14:54)  Wt(kg): --      Weight (kg): 127 ( @ 08:20)      LABS:                        9.9    6.3   )-----------( 133      ( 2018 08:50 )             31.1         139  |  102  |  31<H>  ----------------------------<  222<H>  3.7   |  25  |  2.31<H>    Ca    8.2<L>      2018 08:50    TPro  7.0  /  Alb  3.6  /  TBili  0.5  /  DBili  x   /  AST  15  /  ALT  8<L>  /  AlkPhos  60      Cholesterol, Serum: 163 mg/dL [10 - 199] ( @ 08:50)    PT/INR - ( 2018 08:50 )   PT: 12.7 sec;   INR: 1.14          PTT - ( 2018 08:50 )  PTT:29.2 sec  Urinalysis Basic - ( 2018 12:05 )    Color: Yellow / Appearance: Clear / SG: >=1.030 / pH: x  Gluc: x / Ketone: NEGATIVE  / Bili: Negative / Urobili: 0.2 E.U./dL   Blood: x / Protein: >=300 mg/dL / Nitrite: NEGATIVE   Leuk Esterase: NEGATIVE / RBC: < 5 /HPF / WBC 5-10 /HPF   Sq Epi: x / Non Sq Epi: 0-5 /HPF / Bacteria: Present /HPF            RADIOLOGY & ADDITIONAL STUDIES:      EXAM:  XR CHEST PA LAT 2V                          PROCEDURE DATE:  2018                     INTERPRETATION:  PA AND LATERAL CHEST X-RAY    HISTORY: Le swelling    PRIOR STUDIES: 2017.    FINDINGS: The lungs are clear. Cardiomegaly as before. Small bilateral   basal pleural effusions. Mild thoracic dextroscoliosis.        IMPRESSION:   Cardiomegaly. Small bilateral pleural effusions.                "Thank you for the opportunity to participate in the care of this   patient."        AYAN BAÑUELOS M.D., ATTENDING RADIOLOGIST  This document has been electronically signed. 2018  9:55AM              EXAM:  ECHOCARDIOGRAM (CARDIOL)                          PROCEDURE DATE:  2018                        INTERPRETATION:  Patient Height: 193.0 cm  Patient Weight: 127.0 kg  Heart Rate: 56 bpm  Systolic Pressure: 140 mmHg  Diastolic Pressure: 70 mmHg  BSA: 2.6 m^2  Interpretation Summary  There is severe concentric left ventricular hypertrophy.The left   ventricle is   severely dilated.There is mild global hypokinesis of the left   ventricle.The   left ventricular ejection fraction is estimated to be 40-45%The left   atrium is   dilated.The right atrium is dilated.The right ventricle is normal in   size and   function.No aortic regurgitation noted.No hemodynamically significant   valvular   aortic stenosis.Mitral valve thickening noted.There is moderate mitral   regurgitation.There is mild to moderate tricuspid regurgitation.There is   moderate pulmonary hypertension.The pulmonary artery systolic pressure is   estimated to be 55 mmHg.There is mild pulmonic regurgitation.No aortic   root   dilatation.The inferior vena cava is normal in size (<2.1 cm) with normal   inspiratory collapse (>50%) consistent with normal right atrial pressure.   There is no pericardial effusion.  Procedure Details  A complete two-dimensional transthoracic echocardiogram was performed (2D,  M-mode, spectral and color flow doppler).  Study Quality: Fair.  The study was done portable in the ER  Left Ventricle  There is severe concentric left ventricular hypertrophy.  The left ventricle is severely dilated.  There is mild global hypokinesis of the left ventricle.  The left ventricular ejection fraction is estimated to be 40-45%  Left Atrium  The left atrium is dilated.  Right Atrium  The right atrium is dilated.  Right Ventricle  The right ventricle is normal in size and function.  The tricuspid annular plane systolic excursion (TAPSE) is 18 mm (normal   17mm  or higher).  Aortic Valve  The aortic valve is trileaflet.  No aortic regurgitation noted.  No hemodynamically significant valvular aortic stenosis.  Mitral Valve  Mitral valve thickening noted.  There is moderate mitral regurgitation.  Tricuspid Valve  There is mild to moderate tricuspid regurgitation.  There is moderate pulmonary hypertension.  The pulmonary artery systolic pressure is estimated to be 55 mmHg.  Pulmonic Valve  There is mild pulmonic regurgitation.  There is no pulmonic stenosis.  Arteries and Venous System  No aortic root dilatation.  The inferior vena cava is normal in size (<2.1 cm) with normal inspiratory  collapse (>50%) consistent with normal right atrial pressure.  Pericardium / Pleura  There is no pericardial effusion.  Doppler Measurements & Calculations  MV V2 max: 99.4 cm/sec  MV max P.0 mmHg  MV V2 mean: 61.3 cm/sec  MV mean P.8 mmHg  MV V2 VTI: 21.0 cm  Ao V2 max: 113.6 cm/sec  Ao max P.2 mmHg  Ao max PG (full): 3.8 mmHg  MAUREEN(V,A): 2.6 cm^2  MAUREEN(V,D): 2.6 cm^2  LV max P.4 mmHg  LV V1 max: 59.1 cm/sec  TR Max jonh: 332.7 cm/sec  MMode 2D Measurements & Calculations  IVSd: 1.8 cm  LVIDd: 6.7 cm  LVIDs: 5.3 cm  LVPWd: 1.4 cm  IVS/LVPW: 1.3  FS: 20.8 %  EDV(Teich): 230.3 ml  ESV(Teich): 135.2 ml  LV mass(C)d: 543.3 grams  LV mass(C)dI: 212.6 grams/m^2  SI(cubed): 58.8 ml/m^2  Ao root diam: 3.1 cm  Ao root area: 7.5 cm^2  LA dimension: 5.6 cm  LA/Ao: 1.8  LVOT diam: 2.5 cm  LVOT area: 5.1 cm^2  LVOT area (M): 5.1 cm^2  EDV(MOD-sp4): 165 ml  Interpreting Physician:Helio Whitman DO electronically signed on   2018 17:17:05                "Thank you for the opportunity to participate in the care of this   patient."        HELIO WHITMAN M.D., ATTENDING CARDIOLOGIST  This document has been electronically signed. 2018  5:17PM            EXAM:  US DPLX LWR EXT VEINS COMPL BI                          PROCEDURE DATE:  2018                     INTERPRETATION:  VENOUS DUPLEX DOPPLER OF BOTH LOWER EXTREMITIES dated   2018 11:03 AM    INDICATION: Lower extremity swelling.    TECHNIQUE: Duplex Doppler evaluation including gray-scale ultrasound   imaging, color flow Doppler imaging, and Doppler spectral analysis of the   veins of both lower extremities was performed.     COMPARISON: Lower extremity duplex 2017.    FINDINGS:    Thigh veins: The common femoral, femoral, popliteal, proximal greater   saphenous, and proximal deep femoral veins are patent and free of   thrombus bilaterally. The veins are normally compressible and have normal   phasic flow and augmentation response.    Calf veins: The paired peroneal and posterior tibial calf veins were not   visualized secondary to overlying soft tissue edema.    Also noted is a 8.5 x 1.6 x 4.9 cm right popliteal cyst and a 7.0 x 2.6 x   6.4 cm left popliteal cyst. Enlarged bilateral inguinal lymph nodes.    IMPRESSION:  No deep vein thrombosis seen above the knees.    Bilateral popliteal cysts.            "Thank you for the opportunity to participate in the care of this   patient."    THONG ATWOOD M.D., RADIOLOGY RESIDENT  This document has been electronically signed.  REMINGTON MENDEZ M.D., ATTENDING RADIOLOGIST  This document has been electronically signed. 2018 11:51AM Patient is a 67y Male admitted for bilateral leg edema    The patient has a known history of CKD3/4 with presumed etiology due to:  A review of [facility] has these recent Creatinine trends available for our review:  2018 Blood Urea Nitrogen, Serum 71 mg/dL 7-23 H [facility]2018 Creatinine, Serum 2.03 mg/dL 0.50-1.30 H [facility]2018 eGFR if Non African American 32 mL/min/1.73M2 >=60 L [facility]2018 eGFR if  38 mL/min/1.73M2 >=60 L [facility])  2018 Creatinine, Serum 1.81 mg/dL 0.50-1.30 H [facility])  2018 Blood Urea Nitrogen, Serum 48 mg/dL 7-23 H [facility]2018 eGFR if African American 43 mL/min/1.73M2 >=60 L [facility]2018 eGFR if Non African American 37 mL/min/1.73M2 >=60 L [facility])  01/10/2018 Blood Urea Nitrogen 54 mg/dL 8-20 H (NA)  01/10/2018 Creatinine 1.90 mg/dL 0.64-1.27 H (NA)  01/10/2018 GFR African-American 43 mL/min/1.73 m2 >=60 L (NA)  01/10/2018 GFR Non African-American 35 mL/min/1.73 m2 >=60 L (NA)  2018 Blood Urea Nitrogen 72 mg/dL 8-20 H (NA)  2018 Creatinine 1.98 mg/dL 0.64-1.27 H (NA)  2018 Blood Urea Nitrogen 78 mg/dL 8-20 H (NA)  2018 Creatinine 2.10 mg/dL 0.64-1.27 H (NA)  2018 Blood Urea Nitrogen, Serum 80 mg/dL 7-23 H [facility]2018 Creatinine, Serum 2.50 mg/dL 0.50-1.30 H [facility])  2018 Blood Urea Nitrogen, Serum 71 mg/dL 7-23 H [facility]2018 Creatinine, Serum 2.32 mg/dL 0.50-1.30 H [facility]2018 eGFR if Non African American 28 mL/min/1.73M2 >=60 L [facility]2018 eGFR if African American 32 mL/min/1.73M2 >=60 L [facility])  2018 Blood Urea Nitrogen, Serum 70 mg/dL 7-23 H [facility])  2018 Creatinine, Serum 2.43 mg/dL 0.50-1.30 H [facility])  2017 Blood Urea Nitrogen, Serum 46 mg/dL 7-23 H [facility])  2017 Creatinine, Serum 2.07 mg/dL 0.50-1.30 H [facility])  12/15/2017 Blood Urea Nitrogen, Serum 39 mg/dL 7-23 H [facility])  12/15/2017 Creatinine, Serum 1.87 mg/dL 0.50-1.30 H [facility])  2017 Creatinine 1.78 mg/dL 0.64-1.27 H (NA)  2017 Creatinine 1.79 mg/dL 0.64-1.27 H (NA)  2017 Creatinine 1.81 mg/dL 0.64-1.27 H (NA)  2017 Creatinine 1.92 mg/dL 0.64-1.27 H (NA)  2017 Creatinine 1.73 mg/dL 0.64-1.27 H (NA)  2017 Creatinine 1.36 mg/dL 0.64-1.27 H (NA)  2017 Creatinine, Serum 1.50 mg/dL 0.50-1.30 H [facility] (Hospital)    These values over time seem to suggest progression of disease.    A summary of his history, derived from the primary team history, is:    66 y/o M NON- COMPLIANT WITH MEDS/POOR MEDICAL F/U with PMH of Afib (on eliquis ; last dose a month ago) HTN, DM with peripheral nephropathy, CKD stage 4 ( Cr 2.31 today; baseline cr 1.7), PAD, chronic venous insufficiency, chronic cellulitis , chronic B/L LE edema,  + MRSA right foot ulcer(2017; tx with antibiotics), BPH, poor vision in b/l eyes 2/2 glaucoma who now presents to [facility] ED c/o worsening B/L LE swelling X 1 week. Pt. reports h/o similar B/L LE swelling and was admitted in oct 2017 @ [facility] for chronic venous insufficiency and d/c to Rehab then.  Pt. reports that his swelling had improved after being d/c from rehab in 2018. However, pt. reports that his medicine had ran out a month ago. Since then, he reports not taking any medication except his eye drops. Pt. reports that over the past month since being non- compliant with meds, his swelling in his legs were getting bad. However, in the past week, his swelling got so worse that he was unable to ambulate which prompted him to come to the ER today. He denies any CP, SOB, dizziness, diaphoresis, fatigue, LE edema, palpitations, orthopnea (sleeps on 1 pillow), PND, syncope. In ER /113 , HR, 90 RR 18, T 36.9, O2 97 % RA. Pt received  Atenolol 25 ,g PO X 1, Lisinopril 40 mg PO X 1, Lasix 80 mg IV X 1. Repeat /82. Labs significant for BUN/Cr 31/2.31, BNP . CXR: revealed cardiomegaly as before and b/l small pleural effusion. Doppler B/L LE revealed no DVT above knees, b/l enlarged inguinal lymph nodes, b/l popliteal cyst, paired peroneal and posterior tibial calf veins were not visualized 2/2 to overlying soft tissue edema. Pt admitted to [facility] for further management.  EKG performed in [facility] revealed afib @ 72 bpm with PVCs.  Of note: f/u with Dr. Ford who he hasn’t seen for a year; as per Dr. Liu; EF normal in echo 2016.  he was supposed to f/u vascular which he never did.     Further history taken by myself is:  The patient reports worsening of the leg edema over the past one month. Edema also noted in the upper legs as well. Discussion with vascular PA at bedside reports more likely lymphedema.   The patient reports no dyspnea, no orthopnea, no chest pain. He reports nonadherence to his diuretics and antihypertensives recently.   Reports no hematuria or foamy urine         PAST MEDICAL & SURGICAL HISTORY:  Falls  BPH (benign prostatic hypertrophy)  Chronic venous insufficiency  Glaucoma  Peripheral arterial disease  Kidney stone  DM (diabetes mellitus)  HTN (hypertension)  H/O eye surgery      MEDICATIONS  (STANDING):  amLODIPine   Tablet 5 milliGRAM(s) Oral daily  lisinopril 40 milliGRAM(s) Oral daily    apixaban 5 milliGRAM(s) Oral every 12 hours  cyanocobalamin 1000 MICROGram(s) Oral daily  dextrose 5%. 1000 milliLiter(s) (50 mL/Hr) IV Continuous <Continuous>  dextrose 50% Injectable 12.5 Gram(s) IV Push once  dextrose 50% Injectable 25 Gram(s) IV Push once  dextrose 50% Injectable 25 Gram(s) IV Push once  dorzolamide 2% Ophthalmic Solution 1 Drop(s) Both EYES three times a day  doxazosin 2 milliGRAM(s) Oral daily  insulin lispro (HumaLOG) corrective regimen sliding scale   SubCutaneous Before meals and at bedtime      MEDICATIONS  (PRN):  dextrose Gel 1 Dose(s) Oral once PRN Blood Glucose LESS THAN 70 milliGRAM(s)/deciliter  glucagon  Injectable 1 milliGRAM(s) IntraMuscular once PRN Glucose LESS THAN 70 milligrams/deciliter      Allergies    tetanus-diphth toxoids (Td) adult/adol (Unknown)    Intolerances        SOCIAL HISTORY:    FAMILY HISTORY:  Family history of hypertension (Father, Mother)  Family history of glaucoma (Father, Mother)      T(C): , Max: 36.9 (18 @ 08:11)  T(F): , Max: 98.5 (04-13-18 @ 08:35)  HR: 90 (18 @ 14:54)  BP: 193/78 (18 @ 14:54)  BP(mean): 116 (18 @ 14:54)  RR: 16 (18 @ 14:54)  SpO2: 97% (18 @ 14:54)  Wt(kg): --      Weight (kg): 127 ( @ 08:20)      LABS:                        9.9    6.3   )-----------( 133      ( 2018 08:50 )             31.1         139  |  102  |  31<H>  ----------------------------<  222<H>  3.7   |  25  |  2.31<H>    Ca    8.2<L>      2018 08:50    TPro  7.0  /  Alb  3.6  /  TBili  0.5  /  DBili  x   /  AST  15  /  ALT  8<L>  /  AlkPhos  60      Cholesterol, Serum: 163 mg/dL [10 - 199] ( @ 08:50)    PT/INR - ( 2018 08:50 )   PT: 12.7 sec;   INR: 1.14          PTT - ( 2018 08:50 )  PTT:29.2 sec  Urinalysis Basic - ( 2018 12:05 )    Color: Yellow / Appearance: Clear / SG: >=1.030 / pH: x  Gluc: x / Ketone: NEGATIVE  / Bili: Negative / Urobili: 0.2 E.U./dL   Blood: x / Protein: >=300 mg/dL / Nitrite: NEGATIVE   Leuk Esterase: NEGATIVE / RBC: < 5 /HPF / WBC 5-10 /HPF   Sq Epi: x / Non Sq Epi: 0-5 /HPF / Bacteria: Present /HPF            RADIOLOGY & ADDITIONAL STUDIES:      EXAM:  XR CHEST PA LAT 2V                          PROCEDURE DATE:  2018                     INTERPRETATION:  PA AND LATERAL CHEST X-RAY    HISTORY: Le swelling    PRIOR STUDIES: 2017.    FINDINGS: The lungs are clear. Cardiomegaly as before. Small bilateral   basal pleural effusions. Mild thoracic dextroscoliosis.        IMPRESSION:   Cardiomegaly. Small bilateral pleural effusions.                "Thank you for the opportunity to participate in the care of this   patient."        AYAN BAÑUELOS M.D., ATTENDING RADIOLOGIST  This document has been electronically signed. 2018  9:55AM              EXAM:  ECHOCARDIOGRAM (CARDIOL)                          PROCEDURE DATE:  2018                        INTERPRETATION:  Patient Height: 193.0 cm  Patient Weight: 127.0 kg  Heart Rate: 56 bpm  Systolic Pressure: 140 mmHg  Diastolic Pressure: 70 mmHg  BSA: 2.6 m^2  Interpretation Summary  There is severe concentric left ventricular hypertrophy.The left   ventricle is   severely dilated.There is mild global hypokinesis of the left   ventricle.The   left ventricular ejection fraction is estimated to be 40-45%The left   atrium is   dilated.The right atrium is dilated.The right ventricle is normal in   size and   function.No aortic regurgitation noted.No hemodynamically significant   valvular   aortic stenosis.Mitral valve thickening noted.There is moderate mitral   regurgitation.There is mild to moderate tricuspid regurgitation.There is   moderate pulmonary hypertension.The pulmonary artery systolic pressure is   estimated to be 55 mmHg.There is mild pulmonic regurgitation.No aortic   root   dilatation.The inferior vena cava is normal in size (<2.1 cm) with normal   inspiratory collapse (>50%) consistent with normal right atrial pressure.   There is no pericardial effusion.  Procedure Details  A complete two-dimensional transthoracic echocardiogram was performed (2D,  M-mode, spectral and color flow doppler).  Study Quality: Fair.  The study was done portable in the ER  Left Ventricle  There is severe concentric left ventricular hypertrophy.  The left ventricle is severely dilated.  There is mild global hypokinesis of the left ventricle.  The left ventricular ejection fraction is estimated to be 40-45%  Left Atrium  The left atrium is dilated.  Right Atrium  The right atrium is dilated.  Right Ventricle  The right ventricle is normal in size and function.  The tricuspid annular plane systolic excursion (TAPSE) is 18 mm (normal   17mm  or higher).  Aortic Valve  The aortic valve is trileaflet.  No aortic regurgitation noted.  No hemodynamically significant valvular aortic stenosis.  Mitral Valve  Mitral valve thickening noted.  There is moderate mitral regurgitation.  Tricuspid Valve  There is mild to moderate tricuspid regurgitation.  There is moderate pulmonary hypertension.  The pulmonary artery systolic pressure is estimated to be 55 mmHg.  Pulmonic Valve  There is mild pulmonic regurgitation.  There is no pulmonic stenosis.  Arteries and Venous System  No aortic root dilatation.  The inferior vena cava is normal in size (<2.1 cm) with normal inspiratory  collapse (>50%) consistent with normal right atrial pressure.  Pericardium / Pleura  There is no pericardial effusion.  Doppler Measurements & Calculations  MV V2 max: 99.4 cm/sec  MV max P.0 mmHg  MV V2 mean: 61.3 cm/sec  MV mean P.8 mmHg  MV V2 VTI: 21.0 cm  Ao V2 max: 113.6 cm/sec  Ao max P.2 mmHg  Ao max PG (full): 3.8 mmHg  MAUREEN(V,A): 2.6 cm^2  MAUREEN(V,D): 2.6 cm^2  LV max P.4 mmHg  LV V1 max: 59.1 cm/sec  TR Max jonh: 332.7 cm/sec  MMode 2D Measurements & Calculations  IVSd: 1.8 cm  LVIDd: 6.7 cm  LVIDs: 5.3 cm  LVPWd: 1.4 cm  IVS/LVPW: 1.3  FS: 20.8 %  EDV(Teich): 230.3 ml  ESV(Teich): 135.2 ml  LV mass(C)d: 543.3 grams  LV mass(C)dI: 212.6 grams/m^2  SI(cubed): 58.8 ml/m^2  Ao root diam: 3.1 cm  Ao root area: 7.5 cm^2  LA dimension: 5.6 cm  LA/Ao: 1.8  LVOT diam: 2.5 cm  LVOT area: 5.1 cm^2  LVOT area (M): 5.1 cm^2  EDV(MOD-sp4): 165 ml  Interpreting Physician:Helio Whitman DO electronically signed on   2018 17:17:05                "Thank you for the opportunity to participate in the care of this   patient."        HELIO WHITMAN M.D., ATTENDING CARDIOLOGIST  This document has been electronically signed. 2018  5:17PM            EXAM:  US DPLX LWR EXT VEINS COMPL BI                          PROCEDURE DATE:  2018                     INTERPRETATION:  VENOUS DUPLEX DOPPLER OF BOTH LOWER EXTREMITIES dated   2018 11:03 AM    INDICATION: Lower extremity swelling.    TECHNIQUE: Duplex Doppler evaluation including gray-scale ultrasound   imaging, color flow Doppler imaging, and Doppler spectral analysis of the   veins of both lower extremities was performed.     COMPARISON: Lower extremity duplex 2017.    FINDINGS:    Thigh veins: The common femoral, femoral, popliteal, proximal greater   saphenous, and proximal deep femoral veins are patent and free of   thrombus bilaterally. The veins are normally compressible and have normal   phasic flow and augmentation response.    Calf veins: The paired peroneal and posterior tibial calf veins were not   visualized secondary to overlying soft tissue edema.    Also noted is a 8.5 x 1.6 x 4.9 cm right popliteal cyst and a 7.0 x 2.6 x   6.4 cm left popliteal cyst. Enlarged bilateral inguinal lymph nodes.    IMPRESSION:  No deep vein thrombosis seen above the knees.    Bilateral popliteal cysts.            "Thank you for the opportunity to participate in the care of this   patient."    THONG ATWOOD M.D., RADIOLOGY RESIDENT  This document has been electronically signed.  REMINGTON MENDEZ M.D., ATTENDING RADIOLOGIST  This document has been electronically signed. 2018 11:51AM

## 2018-04-13 NOTE — ED ADULT NURSE NOTE - OBJECTIVE STATEMENT
pt states he has a history of chronic lower extremity swelling but for the last week he has more swelling and is having trouble walking. Pt noted to have large amount of swelling to both lower extremities and redness. pt states redness is normal, unable to palpated pulses due to large amount of swelling. Pt can move toes and has sensation to both.

## 2018-04-13 NOTE — CONSULT NOTE ADULT - PROBLEM SELECTOR RECOMMENDATION 2
Well above goal. Although the leg edema may be lymphedema, his total body sodium and ECV is likely in excess regardless from advancing CKD.     He is prescribed for:  -amLODIPine   Tablet 5 milliGRAM(s) Oral daily  lisinopril 40 milliGRAM(s) Oral daily    He received IV Lasix 80mg x 1 this morning 11AM  He is schedule for torsemide 40mg POQD starting tomorrow at 6AM     This may be appropriate.  BP anticipate to improve  But will monitor renal function going forward.

## 2018-04-13 NOTE — H&P ADULT - PROBLEM SELECTOR PLAN 6
- c/w Doxazosin 2 mg BID.    DVT PPX:   Dispo: - c/w Doxazosin 2 mg BID.    DVT PPX:   Dispo: PT evaluate and treat - ISS

## 2018-04-13 NOTE — PROVIDER CONTACT NOTE (OTHER) - SITUATION
/107. Since admission to unit, pt has received hydralazine, amlodipine, lisinopril, and IVP labetolol with little to no effect.

## 2018-04-13 NOTE — CONSULT NOTE ADULT - PROBLEM SELECTOR RECOMMENDATION 4
May be multifactorial  Rule out GIB per primary team    In the interim, worthwhile to check iron/TIBC, ferritin, VitB12, folate.     Regardless, without profound anemia symptoms, not indicated for SAM agent right now.

## 2018-04-13 NOTE — CONSULT NOTE ADULT - EXTREMITIES COMMENTS
lymphedema and erythema over the skin noted. also onychomycosis.   Also 2+ pitting edema at the knees and thighs but none noted in the sacral area

## 2018-04-13 NOTE — ED PROVIDER NOTE - ATTENDING CONTRIBUTION TO CARE
68 yo male h/o HTN, DM, PAD, venous insufficiency, chronic cellulitis c/o worsening LE swelling x 1 week. Pt reports h/o similar 2/2 venous insufficiency and admitted at [facility] in Oct for same.  Pt reports no meds x 1 wk.  Pt reports difficulty walking due to the severe swelling w/o cp, palpitations, fever, john, orthopnea. No fever, chills, recent travel.  Pt reports leg redness unchanged.  Pt missed his scheduled vascular appt.  Nad, nc/at, lung cta, heart reg, abd soft, nt, ext 4+ edema bilat, + erythema w/o warmth or ttp (unchanged per pt), dp 1+, no gross neuro deficits.  ? venous stasis vs chf vs med noncompliance, low suspicion for dvt or cellulitis.  Plan labs, doppler, diuresis, likely admit.

## 2018-04-13 NOTE — CONSULT NOTE ADULT - PROBLEM SELECTOR RECOMMENDATION 7
on Cardura 2mg POQD - which doubles as an antihypertensive  If Cr rises, then please perform bladder scan to rule out urinary retention

## 2018-04-13 NOTE — ED PROVIDER NOTE - PHYSICAL EXAMINATION
CONSTITUTIONAL: Well-appearing; well-nourished; in no apparent distress.   HEAD: Normocephalic; atraumatic.   EYES: PERRL; EOM intact; conjunctiva and sclera clear  ENT: normal nose; no rhinorrhea; normal pharynx with no erythema or lesions.   NECK: Supple; non-tender; no LAD  CARDIOVASCULAR: Normal S1, S2; no murmurs, rubs, or gallops. Regular rate and rhythm.   RESPIRATORY: Breathing easily; breath sounds clear and equal bilaterally; no wheezes, rhonchi, or rales.  GI: Soft; non-distended; non-tender; no palpable organomegaly.   MSK: FROM at all extremities, normal tone   EXT: severe b/l LE swelling from feet to below knee, 4+pitting, erythematous, no warmth, unable to feel pulses due to extreme swelling, feet warm   SKIN: Normal for age and race; warm; dry; good turgor; no apparent lesions or rash.   NEURO: A & O x 3; face symmetric; grossly unremarkable.   PSYCHOLOGICAL: The patient’s mood and manner are appropriate. CONSTITUTIONAL: Well-appearing; well-nourished; in no apparent distress.   HEAD: Normocephalic; atraumatic.   EYES: PERRL; EOM intact; conjunctiva and sclera clear  ENT: normal nose; no rhinorrhea; normal pharynx with no erythema or lesions.   NECK: Supple; non-tender; no LAD  CARDIOVASCULAR: Normal S1, S2; no murmurs, rubs, or gallops. Regular rate and rhythm.   RESPIRATORY: Breathing easily; breath sounds clear and equal bilaterally; no wheezes, rhonchi, or rales.  GI: Soft; non-distended; non-tender; no palpable organomegaly.   MSK: FROM at all extremities, normal tone   EXT: severe b/l LE swelling from feet to below knee, 4+pitting, erythematous, no warmth, unable to feel pulses due to extreme swelling, dopplerable pulses, feet warm   SKIN: Normal for age and race; warm; dry; good turgor; no apparent lesions or rash.   NEURO: A & O x 3; face symmetric; grossly unremarkable.   PSYCHOLOGICAL: The patient’s mood and manner are appropriate.

## 2018-04-13 NOTE — ED PROVIDER NOTE - MEDICAL DECISION MAKING DETAILS
68 yo M with pmh of HTN, DM, PAD, venous insufficiency, chronic cellulitis c/o worsening LE swelling x 1 week. No cp,sob, f/c. Pt hypertensive(did not take BP meds today). Ext-severe b/l LE swelling from feet to below knee, 4+pitting, erythematous, no warmth, unable to feel pulses due to extreme swelling, dopplerable pulses, feet warm. Like chronic venous insufficiency, less likely acute DVT. r/o CHF

## 2018-04-13 NOTE — CONSULT NOTE ADULT - PROBLEM SELECTOR RECOMMENDATION 9
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 The leg edema appears as lymphedema and he is not having anasarca, pulmonary edema, and has no urinalysis/hypoalbuminemia evidence of acute GN right now.     For CKD workup:  - Urine electrolytes, UPC orders inputted by renal fellow  - Check Renal U/S and with Dopplers to rule out renal artery stenosis and renal vein thrombosis  - Check PTH and 25Vitamin D  - Although no objection to lisinopril 40mg POQD for nephroprotection in light of DM nephropathy and because his Cr trends do not suggest BRIAN, because he has been off of lisinopril for some time, the hyperfiltration that his kidneys are likely encountering will be abruptly discontinued and his creatinine may likely rise in the coming days. Will monitor and as long as the Cr rise is not excessive (>0.3mg/dL), then this may be appropriate.     Will make further GN workup evaluations based on initial workup     As for diuresis, see below in HTN

## 2018-04-13 NOTE — H&P ADULT - PROBLEM SELECTOR PLAN 1
- B/L 4 + pitting edema noted in LE; Lungs CTA B/L  - s/p Lasix 80 mg PO X 1 in ED  - c/w   - echo ordered. f/u echo - B/L 4 + pitting edema noted in LE; Lungs CTA B/L  - s/p Lasix 80 mg PO X 1 in ED  - c/w Torsemide 40 mg PO X 1 starting tomorrow  - echo ordered. f/u echo  - monitor Cr.

## 2018-04-13 NOTE — H&P ADULT - PROBLEM SELECTOR PLAN 3
- BP on arrival to /113  - s/p Atenolol 25 mg PO X 1; lisinopril 40 mg PO X 1 and Lasix 80 mg IV X 1 in ED; repeat /78   - c/w     - monitor BP - Patient has h/o chronic venous insufficiency.   - Duplex of LE 4/13/18  was negative for DVT above knees, b/l enlarged inguinal lymph nodes, b/l popliteal cyst, paired peroneal and posterior tibial calf veins were not visualized 2/2 to overlying soft tissue edema.  - Vascular team consulted. f/u recs

## 2018-04-13 NOTE — ED PROVIDER NOTE - PROGRESS NOTE DETAILS
Spoke with  Dr. Leung states he has not seen the pt in about 1 year. Reports pts legs are always extremely swollen. Last echo in 2016 showed a  normal EF. Recommends [facility] for admission to cards

## 2018-04-13 NOTE — H&P ADULT - HISTORY OF PRESENT ILLNESS
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 Pt. reports h/o similar B/L LE swelling and was admitted in oct 2017 @ [facility] for chronic venous insufficiency and d/c to Rehab then.  Pt. reports that his swelling had improved after being d/c from rehab in Feb 2018. However, pt. reports that his medicine had ran out a month ago. Since then, he reports not taking any medication except his eye drops. Pt. reports that over the past month since being non- compliant with meds, his swelling in his legs were getting bad. However, in the past week, his swelling got so worse that he was unable to ambulate which prompted him to come to the ER today. He denies any CP, SOB, dizziness, diaphoresis, fatigue, LE edema, palpitations, orthopnea (sleeps on 1 pillow), PND, syncope. In ER /113 , HR, 90 RR 18, T 36.9, O2 97 % RA. Pt received  Atenolol 25 ,g PO X 1, Lisinopril 40 mg PO X 1, Lasix 80 mg IV X 1. Repeat /82. Labs significant for BUN/Cr 31/2.31, BNP [id number]:  CXR: revealed cardiomegaly as before and b/l small pleural effusion.  Doppler B/L LE revealed no DVT above knees, b/l enlarged lymph nodes, b/l popliteal cyst, paired peroneal and posterior tibial calf veins were not visualized 2/2 to overlying soft tissue edema. Pt admitted to [facility] for further management.   Of note: f/u with Dr. Ford who he hasn’t seen for a year; he was supposed to f/u vascular which he never did. 68 y/o M NON- COMPLIANT WITH MEDS/POOR MEDICAL F/U with PMH of HTN, DM with peripheral nephropathy, CKD stage 4 ( Cr 2.31 today; baseline cr 1.7), PAD, chronic venous insufficiency, chronic cellulitis , chronic B/L LE edema,  + MRSA right foot ulcer(June 2017; tx with antibiotics), BPH, poor vision in b/l eyes 2/2 glaucoma who now presents to [facility] ED c/o worsening B/L LE swelling X 1 week. Pt. reports h/o similar B/L LE swelling and was admitted in oct 2017 @ [facility] for chronic venous insufficiency and d/c to Rehab then.  Pt. reports that his swelling had improved after being d/c from rehab in Feb 2018. However, pt. reports that his medicine had ran out a month ago. Since then, he reports not taking any medication except his eye drops. Pt. reports that over the past month since being non- compliant with meds, his swelling in his legs were getting bad. However, in the past week, his swelling got so worse that he was unable to ambulate which prompted him to come to the ER today. He denies any CP, SOB, dizziness, diaphoresis, fatigue, LE edema, palpitations, orthopnea (sleeps on 1 pillow), PND, syncope. In ER /113 , HR, 90 RR 18, T 36.9, O2 97 % RA. Pt received  Atenolol 25 ,g PO X 1, Lisinopril 40 mg PO X 1, Lasix 80 mg IV X 1. Repeat /82. Labs significant for BUN/Cr 31/2.31, BNP [id number]:  CXR: revealed cardiomegaly as before and b/l small pleural effusion.  Doppler B/L LE revealed no DVT above knees, b/l enlarged inguinal lymph nodes, b/l popliteal cyst, paired peroneal and posterior tibial calf veins were not visualized 2/2 to overlying soft tissue edema. Pt admitted to [facility] for further management.   Of note: f/u with Dr. Ford who he hasn’t seen for a year; he was supposed to f/u vascular which he never did. 68 y/o M NON- COMPLIANT WITH MEDS/POOR MEDICAL F/U with PMH of Afib (on eliquis ; last dose a month ago) HTN, DM with peripheral nephropathy, CKD stage 4 ( Cr 2.31 today; baseline cr 1.7), PAD, chronic venous insufficiency, chronic cellulitis , chronic B/L LE edema,  + MRSA right foot ulcer(June 2017; tx with antibiotics), BPH, poor vision in b/l eyes 2/2 glaucoma who now presents to [facility] ED c/o worsening B/L LE swelling X 1 week. Pt. reports h/o similar B/L LE swelling and was admitted in oct 2017 @ [facility] for chronic venous insufficiency and d/c to Rehab then.  Pt. reports that his swelling had improved after being d/c from rehab in Feb 2018. However, pt. reports that his medicine had ran out a month ago. Since then, he reports not taking any medication except his eye drops. Pt. reports that over the past month since being non- compliant with meds, his swelling in his legs were getting bad. However, in the past week, his swelling got so worse that he was unable to ambulate which prompted him to come to the ER today. He denies any CP, SOB, dizziness, diaphoresis, fatigue, LE edema, palpitations, orthopnea (sleeps on 1 pillow), PND, syncope. In ER /113 , HR, 90 RR 18, T 36.9, O2 97 % RA. Pt received  Atenolol 25 ,g PO X 1, Lisinopril 40 mg PO X 1, Lasix 80 mg IV X 1. Repeat /82. Labs significant for BUN/Cr 31/2.31, BNP 51878. CXR: revealed cardiomegaly as before and b/l small pleural effusion. Doppler B/L LE revealed no DVT above knees, b/l enlarged inguinal lymph nodes, b/l popliteal cyst, paired peroneal and posterior tibial calf veins were not visualized 2/2 to overlying soft tissue edema. Pt admitted to [facility] for further management.  EKG performed in [facility] revealed afib @ 72 bpm with PVCs.  Of note: f/u with Dr. Ford who he hasn’t seen for a year; as per Dr. Liu; EF normal in echo 2016.  he was supposed to f/u vascular which he never did. 66 y/o M NON- COMPLIANT WITH MEDS/POOR MEDICAL F/U with PMH of Afib (on eliquis ; last dose a month ago) HTN, DM with peripheral nephropathy, CKD stage 4 ( Cr 2.31 today; baseline cr 1.7), PAD, chronic venous insufficiency, chronic cellulitis , chronic B/L LE edema,  + MRSA right foot ulcer(June 2017; tx with antibiotics), BPH, poor vision in b/l eyes 2/2 glaucoma who now presents to [facility] ED c/o worsening B/L LE swelling X 1 week. Pt. reports h/o similar B/L LE swelling and was admitted in oct 2017 @ [facility] for chronic venous insufficiency and d/c to Rehab then.  Pt. reports that his swelling had improved after being d/c from rehab in Feb 2018. However, pt. reports that his medicine had ran out a month ago. Since then, he reports not taking any medication except his eye drops. Pt. reports that over the past month since being non- compliant with meds, his swelling in his legs were getting bad. However, in the past week, his swelling got so worse that he was unable to ambulate which prompted him to come to the ER today. He denies any CP, SOB, dizziness, diaphoresis, fatigue, palpitations, orthopnea (sleeps on 1 pillow), PND, syncope. In ER /113 , HR, 90 RR 18, T 36.9, O2 97 % RA. Pt received  Atenolol 25 ,g PO X 1, Lisinopril 40 mg PO X 1, Lasix 80 mg IV X 1. Repeat /82. Labs significant for BUN/Cr 31/2.31, BNP 98975. CXR: revealed cardiomegaly as before and b/l small pleural effusion. Doppler B/L LE revealed no DVT above knees, b/l enlarged inguinal lymph nodes, b/l popliteal cyst, paired peroneal and posterior tibial calf veins were not visualized 2/2 to overlying soft tissue edema. Pt admitted to [facility] for further management.  EKG performed in [facility] revealed afib @ 72 bpm with PVCs.  Of note: f/u with Dr. Ford who he hasn’t seen for a year; as per Dr. Liu; EF normal in echo 2016.  he was supposed to f/u vascular which he never did.

## 2018-04-13 NOTE — H&P ADULT - PROBLEM SELECTOR PLAN 5
- poor vision loss b/l eyes 2/2 glaucoma  - c/w dorzemalide, combigan - Cr 2.31 on admission; baseline Cr 1.7  - Renal consulted, f/u rec  - f/u 24 hr urine/protein; monitor Cr

## 2018-04-13 NOTE — H&P ADULT - PROBLEM SELECTOR PLAN 4
- ISS - BP on arrival to /113  - s/p Atenolol 25 mg PO X 1; lisinopril 40 mg PO X 1 and Lasix 80 mg IV X 1 in ED; repeat /78  and then 211/118. pt/ given Hydralazine 10 mg PO X 1. monitor BP  - Home atenolol d/c, c/w amlodipine 5 mg daily and lisinopril 40 mg daily as per Dr. Blackman.     - monitor BP

## 2018-04-13 NOTE — CONSULT NOTE ADULT - ASSESSMENT
66 yo M with bilateral lymphedema, no signs of cellulitis at this time, afebrile, no WBC  1-Recommend daily compression with ACE and elevation above the heart  2-DVT ppx -> on Eliquis for afib  3-Follow up with Dr. Bhagat as outpatient in 1-2 weeks after discharge to arrange for a lymphedema pump for home to reduce chronic edema
67M hx of CKD3/4 (the Cr trends and baseline are above) presumably due to long standing DM (has 35 year history of DM and reports has DM retinopathy history), uncontrolled HTN, Afib (on eliquis ; last dose a month ago) PAD, chronic venous insufficiency, chronic cellulitis , chronic B/L LE edema,  + MRSA right foot ulcer(June 2017; tx with antibiotics), BPH, poor vision in b/l eyes 2/2 glaucoma who now presents to [facility] ED c/o worsening B/L LE swelling X 1 week. Renal consulted for CKD management, volume management, and HTN management.

## 2018-04-13 NOTE — ED PROVIDER NOTE - OBJECTIVE STATEMENT
68 yo M with pmh of HTN, DM, PAD, venous insufficiency, chronic cellulitis c/o worsening LE swelling x 1 week. Pt states he was admitted back in Oct to [facility] for LE swelling and was told it was due to his venous insufficiency and was discharged to rehab. Pt states after a while the swelling improved. Pt states over the past week the swelling has gotten much worse and he is having difficulty walking due to the swelling. Denies fever, chills, CP, sob, recent travel. Pt reports legs are always red. Pt states he was supposed to have an appointment with vascular but was unable to make it because his grandson got into a car accident. 68 yo M with pmh of HTN, DM, PAD, venous insufficiency, chronic cellulitis c/o worsening LE swelling x 1 week. Pt states he was admitted back in Oct to [facility] for LE swelling and was told it was due to his venous insufficiency and was discharged to rehab. Pt states after a while the swelling improved. Pt states over the past week the swelling has gotten much worse and he is having difficulty walking due to the swelling. Pt reports not taking his meds x 1 week including his torosemide. Denies fever, chills, CP, sob, recent travel. Pt reports legs are always red. Pt states he was supposed to have an appointment with vascular but was unable to make it because his grandson got into a car accident.

## 2018-04-13 NOTE — CONSULT NOTE ADULT - SUBJECTIVE AND OBJECTIVE BOX
Vascular Attending:  Dr. Bhagat      HPI:  66 y/o M NON- COMPLIANT WITH MEDS/POOR MEDICAL F/U with PMH of Afib (on eliquis ; last dose a month ago) HTN, DM with peripheral nephropathy, CKD stage 4 ( Cr 2.31 today; baseline cr 1.7), PAD, chronic venous insufficiency, chronic cellulitis , chronic B/L LE edema,  + MRSA right foot ulcer(June 2017; tx with antibiotics), BPH, poor vision in b/l eyes 2/2 glaucoma who now presents to [facility] ED c/o worsening B/L LE swelling X 1 week. Pt. reports h/o similar B/L LE swelling and was admitted in oct 2017 @ [facility] for chronic venous insufficiency and d/c to Rehab then.  Pt. reports that his swelling had improved after being d/c from rehab in Feb 2018. However, he reports that his medicine had ran out a month ago. Since then, he reports not taking any medication except his eye drops. Pt. reports that over the past month since being non- compliant with meds, his swelling in his legs became alot worse preventing him from being able to ambulate which prompted him to come to the ER.   Pt admitted to [facility] for further management.  EKG performed in [facility] revealed afib @ 72 bpm with PVCs.  Of note: f/u with Dr. Ford who he hasn’t seen for a year; as per Dr. Liu; EF normal in echo 2016.  he was supposed to f/u vascular which he never did. (13 Apr 2018 14:54)  Vascular consulted for bilateral leg edema. Patient states he was seen by vascular Surgery on his last admission in the summer of 2017 but has not returned to the office for follow up.      PAST MEDICAL & SURGICAL HISTORY:  Falls  BPH (benign prostatic hypertrophy)  Chronic venous insufficiency  Glaucoma  Peripheral arterial disease  Kidney stone  DM (diabetes mellitus)  HTN (hypertension)  H/O eye surgery      MEDICATIONS  (STANDING):  amLODIPine   Tablet 5 milliGRAM(s) Oral daily  apixaban 5 milliGRAM(s) Oral every 12 hours  cyanocobalamin 1000 MICROGram(s) Oral daily  dextrose 5%. 1000 milliLiter(s) (50 mL/Hr) IV Continuous <Continuous>  dextrose 50% Injectable 12.5 Gram(s) IV Push once  dextrose 50% Injectable 25 Gram(s) IV Push once  dextrose 50% Injectable 25 Gram(s) IV Push once  dorzolamide 2% Ophthalmic Solution 1 Drop(s) Both EYES three times a day  doxazosin 2 milliGRAM(s) Oral daily  insulin lispro (HumaLOG) corrective regimen sliding scale   SubCutaneous Before meals and at bedtime  lisinopril 40 milliGRAM(s) Oral daily    MEDICATIONS  (PRN):  dextrose Gel 1 Dose(s) Oral once PRN Blood Glucose LESS THAN 70 milliGRAM(s)/deciliter  glucagon  Injectable 1 milliGRAM(s) IntraMuscular once PRN Glucose LESS THAN 70 milligrams/deciliter      Allergies    tetanus-diphth toxoids (Td) adult/adol (Unknown)    Intolerances        SOCIAL HISTORY:    FAMILY HISTORY:  Family history of hypertension (Father, Mother)  Family history of glaucoma (Father, Mother)      Vital Signs Last 24 Hrs  T(C): 36.1 (13 Apr 2018 16:30), Max: 36.9 (13 Apr 2018 08:11)  T(F): 97 (13 Apr 2018 16:30), Max: 98.5 (13 Apr 2018 08:35)  HR: 90 (13 Apr 2018 14:54) (60 - 90)  BP: 193/78 (13 Apr 2018 14:54) (193/78 - 206/113)  BP(mean): 116 (13 Apr 2018 14:54) (116 - 116)  RR: 16 (13 Apr 2018 14:54) (16 - 18)  SpO2: 97% (13 Apr 2018 14:54) (95% - 97%)    PHYSICAL EXAM:      Constitutional:    Respiratory:    Cardiovascular:    Extremities: bilateral leg with 4+edema, skin with dry scales    Vascular: Pedal pulses non-palpable 2/2 +lymphadema/edema, Doppler: DP/PT with deep compression  Neurological:        LABS:                        9.9    6.3   )-----------( 133      ( 13 Apr 2018 08:50 )             31.1     04-13    139  |  102  |  31<H>  ----------------------------<  222<H>  3.7   |  25  |  2.31<H>    Ca    8.2<L>      13 Apr 2018 08:50    TPro  7.0  /  Alb  3.6  /  TBili  0.5  /  DBili  x   /  AST  15  /  ALT  8<L>  /  AlkPhos  60  04-13    PT/INR - ( 13 Apr 2018 08:50 )   PT: 12.7 sec;   INR: 1.14          PTT - ( 13 Apr 2018 08:50 )  PTT:29.2 sec  Urinalysis Basic - ( 13 Apr 2018 12:05 )    Color: Yellow / Appearance: Clear / SG: >=1.030 / pH: x  Gluc: x / Ketone: NEGATIVE  / Bili: Negative / Urobili: 0.2 E.U./dL   Blood: x / Protein: >=300 mg/dL / Nitrite: NEGATIVE   Leuk Esterase: NEGATIVE / RBC: < 5 /HPF / WBC 5-10 /HPF   Sq Epi: x / Non Sq Epi: 0-5 /HPF / Bacteria: Present /HPF        RADIOLOGY & ADDITIONAL STUDIES    < from: US Duplex Venous Lower Ext Complete, Bilateral (04.13.18 @ 11:03) >  INTERPRETATION:  VENOUS DUPLEX DOPPLER OF BOTH LOWER EXTREMITIES dated   4/13/2018 11:03 AM    INDICATION: Lower extremity swelling.    TECHNIQUE: Duplex Doppler evaluation including gray-scale ultrasound   imaging, color flow Doppler imaging, and Doppler spectral analysis of the   veins of both lower extremities was performed.     COMPARISON: Lower extremity duplex 6/29/2017.    FINDINGS:    Thigh veins: The common femoral, femoral, popliteal, proximal greater   saphenous, and proximal deep femoral veins are patent and free of   thrombus bilaterally. The veins are normally compressible and have normal   phasic flow and augmentation response.    Calf veins: The paired peroneal and posterior tibial calf veins were not   visualized secondary to overlying soft tissue edema.    Also noted is a 8.5 x 1.6 x 4.9 cm right popliteal cyst and a 7.0 x 2.6 x   6.4 cm left popliteal cyst. Enlarged bilateral inguinal lymph nodes.    IMPRESSION:  No deep vein thrombosis seen above the knees.    Bilateral popliteal cysts.    < end of copied text > Vascular Attending:  Dr. Bhagat      HPI:  68 y/o M NON- COMPLIANT WITH MEDS/POOR MEDICAL F/U with PMH of Afib (on eliquis ; last dose a month ago) HTN, DM with peripheral nephropathy, CKD stage 4 ( Cr 2.31 today; baseline cr 1.7), PAD, chronic venous insufficiency, chronic cellulitis , chronic B/L LE edema,  + MRSA right foot ulcer(June 2017; tx with antibiotics), BPH, poor vision in b/l eyes 2/2 glaucoma who now presents to [facility] ED c/o worsening B/L LE swelling X 1 week. Pt. reports h/o similar B/L LE swelling and was admitted in oct 2017 @ [facility] for chronic venous insufficiency and d/c to Rehab then.  Pt. reports that his swelling had improved after being d/c from rehab in Feb 2018. However, he reports that his medicine had ran out a month ago. Since then, he reports not taking any medication except his eye drops. Pt. reports that over the past month since being non- compliant with meds, his swelling in his legs became alot worse preventing him from being able to ambulate which prompted him to come to the ER.   Pt admitted to [facility] for further management.  EKG performed in [facility] revealed afib @ 72 bpm with PVCs.  Of note: f/u with Dr. Ford who he hasn’t seen for a year; as per Dr. Liu; EF normal in echo 2016.  he was supposed to f/u vascular which he never did. (13 Apr 2018 14:54)  Vascular consulted for bilateral leg edema. Patient states he was seen by vascular Surgery on his last admission in the summer of 2017 but has not returned to the office for follow up. States his wounds have healed since and his legs have been doing "okay" until he stopped taking his medications.  Denies SOB, CP, claudication or rest pain.      PAST MEDICAL & SURGICAL HISTORY:  Falls  BPH (benign prostatic hypertrophy)  Chronic venous insufficiency  Glaucoma  Peripheral arterial disease  Kidney stone  DM (diabetes mellitus)  HTN (hypertension)  H/O eye surgery      MEDICATIONS  (STANDING):  amLODIPine   Tablet 5 milliGRAM(s) Oral daily  apixaban 5 milliGRAM(s) Oral every 12 hours  cyanocobalamin 1000 MICROGram(s) Oral daily  dextrose 5%. 1000 milliLiter(s) (50 mL/Hr) IV Continuous <Continuous>  dextrose 50% Injectable 12.5 Gram(s) IV Push once  dextrose 50% Injectable 25 Gram(s) IV Push once  dextrose 50% Injectable 25 Gram(s) IV Push once  dorzolamide 2% Ophthalmic Solution 1 Drop(s) Both EYES three times a day  doxazosin 2 milliGRAM(s) Oral daily  insulin lispro (HumaLOG) corrective regimen sliding scale   SubCutaneous Before meals and at bedtime  lisinopril 40 milliGRAM(s) Oral daily    MEDICATIONS  (PRN):  dextrose Gel 1 Dose(s) Oral once PRN Blood Glucose LESS THAN 70 milliGRAM(s)/deciliter  glucagon  Injectable 1 milliGRAM(s) IntraMuscular once PRN Glucose LESS THAN 70 milligrams/deciliter      Allergies  tetanus-diphth toxoids (Td) adult/adol (Unknown)      FAMILY HISTORY:  Family history of hypertension (Father, Mother)  Family history of glaucoma (Father, Mother)      Vital Signs Last 24 Hrs  T(C): 36.1 (13 Apr 2018 16:30), Max: 36.9 (13 Apr 2018 08:11)  T(F): 97 (13 Apr 2018 16:30), Max: 98.5 (13 Apr 2018 08:35)  HR: 90 (13 Apr 2018 14:54) (60 - 90)  BP: 193/78 (13 Apr 2018 14:54) (193/78 - 206/113)  BP(mean): 116 (13 Apr 2018 14:54) (116 - 116)  RR: 16 (13 Apr 2018 14:54) (16 - 18)  SpO2: 97% (13 Apr 2018 14:54) (95% - 97%)    PHYSICAL EXAM:      Constitutional: alert and awake, NAD    Respiratory: no respiratory distress    Cardiovascular: RRR S1 S2    Extremities: bilateral leg lyphedema (4+) L>R, bilateral chronic skin hyperpigmentation/redness from ankle to below the knee with small blisters. No warmth, no open wounds, no drainage, no tenderness.    Vascular: Pedal pulses non-palpable 2/2 +lymphadema/edema, Doppler: DP/PT with deep compression          LABS:                        9.9    6.3   )-----------( 133      ( 13 Apr 2018 08:50 )             31.1     04-13    139  |  102  |  31<H>  ----------------------------<  222<H>  3.7   |  25  |  2.31<H>    Ca    8.2<L>      13 Apr 2018 08:50    TPro  7.0  /  Alb  3.6  /  TBili  0.5  /  DBili  x   /  AST  15  /  ALT  8<L>  /  AlkPhos  60  04-13    PT/INR - ( 13 Apr 2018 08:50 )   PT: 12.7 sec;   INR: 1.14          PTT - ( 13 Apr 2018 08:50 )  PTT:29.2 sec  Urinalysis Basic - ( 13 Apr 2018 12:05 )    Color: Yellow / Appearance: Clear / SG: >=1.030 / pH: x  Gluc: x / Ketone: NEGATIVE  / Bili: Negative / Urobili: 0.2 E.U./dL   Blood: x / Protein: >=300 mg/dL / Nitrite: NEGATIVE   Leuk Esterase: NEGATIVE / RBC: < 5 /HPF / WBC 5-10 /HPF   Sq Epi: x / Non Sq Epi: 0-5 /HPF / Bacteria: Present /HPF        RADIOLOGY & ADDITIONAL STUDIES    < from: US Duplex Venous Lower Ext Complete, Bilateral (04.13.18 @ 11:03) >  INTERPRETATION:  VENOUS DUPLEX DOPPLER OF BOTH LOWER EXTREMITIES dated   4/13/2018 11:03 AM    INDICATION: Lower extremity swelling.    TECHNIQUE: Duplex Doppler evaluation including gray-scale ultrasound   imaging, color flow Doppler imaging, and Doppler spectral analysis of the   veins of both lower extremities was performed.     COMPARISON: Lower extremity duplex 6/29/2017.    FINDINGS:    Thigh veins: The common femoral, femoral, popliteal, proximal greater   saphenous, and proximal deep femoral veins are patent and free of   thrombus bilaterally. The veins are normally compressible and have normal   phasic flow and augmentation response.    Calf veins: The paired peroneal and posterior tibial calf veins were not   visualized secondary to overlying soft tissue edema.    Also noted is a 8.5 x 1.6 x 4.9 cm right popliteal cyst and a 7.0 x 2.6 x   6.4 cm left popliteal cyst. Enlarged bilateral inguinal lymph nodes.    IMPRESSION:  No deep vein thrombosis seen above the knees.    Bilateral popliteal cysts.    < end of copied text >

## 2018-04-13 NOTE — H&P ADULT - EXTREMITIES COMMENTS
B/L 4 + pitting edema under knee, erythematous, no warmth, not TTP, B/L enlarged  inguinal lymph nodes; healed right foot ulcer.

## 2018-04-13 NOTE — H&P ADULT - PROBLEM SELECTOR PLAN 8
- c/w Doxazosin 2 mg BID.    DVT PPX: Eliquis  Dispo: PT evaluate and treat; f/u Echo; f/u vasc rec and renal rec; monitor Cr

## 2018-04-13 NOTE — CONSULT NOTE ADULT - PROBLEM SELECTOR RECOMMENDATION 3
Calcium 8.2 - Alb 3.6 - corrected is low normal  Check PTH and 25Vit D    Add on magnesium and phosphorus for evaluation

## 2018-04-13 NOTE — H&P ADULT - RS GEN PE MLT RESP DETAILS PC
normal/breath sounds equal/respirations non-labored/no rales/no chest wall tenderness/no intercostal retractions/airway patent/no subcutaneous emphysema/good air movement/clear to auscultation bilaterally/no rhonchi/no wheezes

## 2018-04-14 LAB
ANION GAP SERPL CALC-SCNC: 15 MMOL/L — SIGNIFICANT CHANGE UP (ref 5–17)
BUN SERPL-MCNC: 30 MG/DL — HIGH (ref 7–23)
CALCIUM SERPL-MCNC: 8.4 MG/DL — SIGNIFICANT CHANGE UP (ref 8.4–10.5)
CHLORIDE SERPL-SCNC: 102 MMOL/L — SIGNIFICANT CHANGE UP (ref 96–108)
CHLORIDE UR-SCNC: 113 MMOL/L — SIGNIFICANT CHANGE UP
CO2 SERPL-SCNC: 23 MMOL/L — SIGNIFICANT CHANGE UP (ref 22–31)
CREAT ?TM UR-MCNC: 43 MG/DL — SIGNIFICANT CHANGE UP
CREAT SERPL-MCNC: 2.1 MG/DL — HIGH (ref 0.5–1.3)
CULTURE RESULTS: SIGNIFICANT CHANGE UP
FERRITIN SERPL-MCNC: 103 NG/ML — SIGNIFICANT CHANGE UP (ref 30–400)
GLUCOSE BLDC GLUCOMTR-MCNC: 107 MG/DL — HIGH (ref 70–99)
GLUCOSE BLDC GLUCOMTR-MCNC: 109 MG/DL — HIGH (ref 70–99)
GLUCOSE BLDC GLUCOMTR-MCNC: 119 MG/DL — HIGH (ref 70–99)
GLUCOSE BLDC GLUCOMTR-MCNC: 261 MG/DL — HIGH (ref 70–99)
GLUCOSE SERPL-MCNC: 129 MG/DL — HIGH (ref 70–99)
HBA1C BLD-MCNC: 5.9 % — HIGH (ref 4–5.6)
HCT VFR BLD CALC: 29.9 % — LOW (ref 39–50)
HGB BLD-MCNC: 9.6 G/DL — LOW (ref 13–17)
IRON SATN MFR SERPL: 10 % — LOW (ref 16–55)
IRON SATN MFR SERPL: 18 UG/DL — LOW (ref 45–165)
MAGNESIUM SERPL-MCNC: 2 MG/DL — SIGNIFICANT CHANGE UP (ref 1.6–2.6)
MCHC RBC-ENTMCNC: 26.2 PG — LOW (ref 27–34)
MCHC RBC-ENTMCNC: 32.1 G/DL — SIGNIFICANT CHANGE UP (ref 32–36)
MCV RBC AUTO: 81.5 FL — SIGNIFICANT CHANGE UP (ref 80–100)
OSMOLALITY UR: 378 MOSMOL/KG — SIGNIFICANT CHANGE UP (ref 100–650)
PHOSPHATE SERPL-MCNC: 3.9 MG/DL — SIGNIFICANT CHANGE UP (ref 2.5–4.5)
PLATELET # BLD AUTO: 137 K/UL — LOW (ref 150–400)
POTASSIUM SERPL-MCNC: 3.3 MMOL/L — LOW (ref 3.5–5.3)
POTASSIUM SERPL-SCNC: 3.3 MMOL/L — LOW (ref 3.5–5.3)
POTASSIUM UR-SCNC: 34 MMOL/L — SIGNIFICANT CHANGE UP
PROT ?TM UR-MCNC: 148 MG/DL — HIGH (ref 0–12)
RBC # BLD: 3.67 M/UL — LOW (ref 4.2–5.8)
RBC # FLD: 15.9 % — SIGNIFICANT CHANGE UP (ref 10.3–16.9)
SODIUM SERPL-SCNC: 140 MMOL/L — SIGNIFICANT CHANGE UP (ref 135–145)
SODIUM UR-SCNC: 101 MMOL/L — SIGNIFICANT CHANGE UP
SPECIMEN SOURCE: SIGNIFICANT CHANGE UP
TIBC SERPL-MCNC: 189 UG/DL — LOW (ref 220–430)
UIBC SERPL-MCNC: 171 UG/DL — SIGNIFICANT CHANGE UP (ref 110–370)
URATE UR-MCNC: 11.4 MG/DL — SIGNIFICANT CHANGE UP
UUN UR-MCNC: 277 MG/DL — SIGNIFICANT CHANGE UP
WBC # BLD: 7.1 K/UL — SIGNIFICANT CHANGE UP (ref 3.8–10.5)
WBC # FLD AUTO: 7.1 K/UL — SIGNIFICANT CHANGE UP (ref 3.8–10.5)

## 2018-04-14 PROCEDURE — 99223 1ST HOSP IP/OBS HIGH 75: CPT

## 2018-04-14 PROCEDURE — 99232 SBSQ HOSP IP/OBS MODERATE 35: CPT | Mod: GC

## 2018-04-14 RX ORDER — HYDRALAZINE HCL 50 MG
25 TABLET ORAL ONCE
Qty: 0 | Refills: 0 | Status: COMPLETED | OUTPATIENT
Start: 2018-04-14 | End: 2018-04-14

## 2018-04-14 RX ORDER — BRIMONIDINE TARTRATE 2 MG/MG
1 SOLUTION/ DROPS OPHTHALMIC
Qty: 0 | Refills: 0 | Status: DISCONTINUED | OUTPATIENT
Start: 2018-04-14 | End: 2018-04-17

## 2018-04-14 RX ORDER — LABETALOL HCL 100 MG
20 TABLET ORAL ONCE
Qty: 0 | Refills: 0 | Status: COMPLETED | OUTPATIENT
Start: 2018-04-14 | End: 2018-04-14

## 2018-04-14 RX ORDER — SODIUM CHLORIDE 0.65 %
1 AEROSOL, SPRAY (ML) NASAL DAILY
Qty: 0 | Refills: 0 | Status: DISCONTINUED | OUTPATIENT
Start: 2018-04-14 | End: 2018-04-17

## 2018-04-14 RX ORDER — POTASSIUM CHLORIDE 20 MEQ
40 PACKET (EA) ORAL EVERY 4 HOURS
Qty: 0 | Refills: 0 | Status: DISCONTINUED | OUTPATIENT
Start: 2018-04-14 | End: 2018-04-14

## 2018-04-14 RX ORDER — FUROSEMIDE 40 MG
80 TABLET ORAL ONCE
Qty: 0 | Refills: 0 | Status: COMPLETED | OUTPATIENT
Start: 2018-04-14 | End: 2018-04-14

## 2018-04-14 RX ORDER — TIMOLOL 0.5 %
1 DROPS OPHTHALMIC (EYE)
Qty: 0 | Refills: 0 | Status: DISCONTINUED | OUTPATIENT
Start: 2018-04-14 | End: 2018-04-17

## 2018-04-14 RX ORDER — BENZOCAINE AND MENTHOL 5; 1 G/100ML; G/100ML
1 LIQUID ORAL DAILY
Qty: 0 | Refills: 0 | Status: DISCONTINUED | OUTPATIENT
Start: 2018-04-14 | End: 2018-04-17

## 2018-04-14 RX ORDER — NYSTATIN CREAM 100000 [USP'U]/G
1 CREAM TOPICAL
Qty: 0 | Refills: 0 | Status: DISCONTINUED | OUTPATIENT
Start: 2018-04-14 | End: 2018-04-17

## 2018-04-14 RX ORDER — CARVEDILOL PHOSPHATE 80 MG/1
6.25 CAPSULE, EXTENDED RELEASE ORAL EVERY 12 HOURS
Qty: 0 | Refills: 0 | Status: DISCONTINUED | OUTPATIENT
Start: 2018-04-14 | End: 2018-04-15

## 2018-04-14 RX ADMIN — Medication 6: at 11:58

## 2018-04-14 RX ADMIN — CARVEDILOL PHOSPHATE 6.25 MILLIGRAM(S): 80 CAPSULE, EXTENDED RELEASE ORAL at 13:12

## 2018-04-14 RX ADMIN — BENZOCAINE AND MENTHOL 1 LOZENGE: 5; 1 LIQUID ORAL at 02:11

## 2018-04-14 RX ADMIN — AMLODIPINE BESYLATE 10 MILLIGRAM(S): 2.5 TABLET ORAL at 06:07

## 2018-04-14 RX ADMIN — DORZOLAMIDE HYDROCHLORIDE 1 DROP(S): 20 SOLUTION/ DROPS OPHTHALMIC at 22:06

## 2018-04-14 RX ADMIN — Medication 80 MILLIGRAM(S): at 17:59

## 2018-04-14 RX ADMIN — Medication 40 MILLIGRAM(S): at 06:30

## 2018-04-14 RX ADMIN — Medication 2 MILLIGRAM(S): at 06:07

## 2018-04-14 RX ADMIN — BRIMONIDINE TARTRATE 1 DROP(S): 2 SOLUTION/ DROPS OPHTHALMIC at 22:05

## 2018-04-14 RX ADMIN — NYSTATIN CREAM 1 APPLICATION(S): 100000 CREAM TOPICAL at 17:58

## 2018-04-14 RX ADMIN — Medication 20 MILLIGRAM(S): at 00:30

## 2018-04-14 RX ADMIN — DORZOLAMIDE HYDROCHLORIDE 1 DROP(S): 20 SOLUTION/ DROPS OPHTHALMIC at 14:00

## 2018-04-14 RX ADMIN — APIXABAN 5 MILLIGRAM(S): 2.5 TABLET, FILM COATED ORAL at 06:07

## 2018-04-14 RX ADMIN — APIXABAN 5 MILLIGRAM(S): 2.5 TABLET, FILM COATED ORAL at 17:59

## 2018-04-14 RX ADMIN — DORZOLAMIDE HYDROCHLORIDE 1 DROP(S): 20 SOLUTION/ DROPS OPHTHALMIC at 06:07

## 2018-04-14 RX ADMIN — Medication 1 APPLICATION(S): at 17:59

## 2018-04-14 RX ADMIN — Medication 25 MILLIGRAM(S): at 22:39

## 2018-04-14 RX ADMIN — LISINOPRIL 40 MILLIGRAM(S): 2.5 TABLET ORAL at 06:07

## 2018-04-14 RX ADMIN — PREGABALIN 1000 MICROGRAM(S): 225 CAPSULE ORAL at 11:38

## 2018-04-14 RX ADMIN — Medication 1 DROP(S): at 22:05

## 2018-04-14 RX ADMIN — Medication 25 MILLIGRAM(S): at 06:07

## 2018-04-14 RX ADMIN — Medication 1 SPRAY(S): at 02:11

## 2018-04-14 NOTE — PROVIDER CONTACT NOTE (OTHER) - BACKGROUND
Pt has received several anti-hypertensive meds, the last being 10mg IVP Hydralazine at 2120. Pt has PMH of HTN and comes in with +4 B/L LE Edema.

## 2018-04-14 NOTE — PROGRESS NOTE ADULT - PROBLEM SELECTOR PLAN 1
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 The leg edema appears as lymphedema and he is not having anasarca, pulmonary edema, and has no urinalysis/hypoalbuminemia evidence of acute GN right now.     For CKD workup:  - Ignacio 101 UK 34 UCr 43 UCl 114  UUA 11.4 UOsm 398 /43  - Check Renal U/S and with Dopplers to rule out renal artery stenosis and renal vein thrombosis - ordered pending   - Check PTH and 25Vitamin D - please order - needs gold top tube and likely not able to be added on   - Although no objection to lisinopril 40mg POQD for nephroprotection in light of DM nephropathy

## 2018-04-14 NOTE — PHYSICAL THERAPY INITIAL EVALUATION ADULT - ADDITIONAL COMMENTS
Prior use of 2 canes, no recent hx of falls (many years ago on black ice). Pt is blind in right eye and w/ lens replacement in left that intermittently works as per pt. Pt has stairs to negotiate (number TBD). Pt also complains of pain of b/l inguinal hernias.

## 2018-04-14 NOTE — PROGRESS NOTE ADULT - PROBLEM SELECTOR PLAN 1
- B/L 4 + pitting edema noted in LE; + JVD in exam today; Lungs CTA B/L; b/l leg edema most likely 2/2 lymphedema  - s/p Lasix 80 mg PO X 1 in ED  - s/p home torsemide 40 mg today am and s/p Lasix 80 mg IV X 1 today evening  - please re-evaluate pt. in am and order diuresis appropriately  - ECHO 4/13/18 revealed   severe concentric LVH, severely dilated LV, mild global hypokinesis of the left ventricle. LVEF  40-45%, dilated LA and RA, Mitral valve thickening noted. Moderate MR, mild to moderate TR. moderate pulmonary hypertension.PASP 55 mmHg,  mild pulmonic regurgitation  - monitor Cr. Cr 2.10 today, 2.31 on admission.

## 2018-04-14 NOTE — PHYSICAL THERAPY INITIAL EVALUATION ADULT - GAIT DEVIATIONS NOTED, PT EVAL
decreased cora/increased time in double stance/decreased weight-shifting ability/decreased stride length

## 2018-04-14 NOTE — PHYSICAL THERAPY INITIAL EVALUATION ADULT - CRITERIA FOR SKILLED THERAPEUTIC INTERVENTIONS
functional limitations in following categories/risk reduction/prevention/rehab potential/impairments found/therapy frequency/anticipated discharge recommendation

## 2018-04-14 NOTE — PROGRESS NOTE ADULT - PROBLEM SELECTOR PLAN 4
- BP on arrival to /113; BP today 160s/70s  - Home atenolol d/c, c/w amlodipine 5 mg daily and lisinopril 40 mg daily as per Dr. Blackman.  pt. with multiple PVCs o/n, pt. started on Carvedilol 6.25 mg BID today.  - monitor BP

## 2018-04-14 NOTE — PROGRESS NOTE ADULT - SUBJECTIVE AND OBJECTIVE BOX
Patient seen and examined at bedside.     Interval events reviewed  Had IV labetalol 20 and IV hydralazine 10 pushes last evening for BP  BP in a better range.   Dosed for his chronic oral medications  Chemistries reviewed   Cr improved.     He feels well  No dyspnea, no orthopnea, no chest pain  Leg swelling wrapped under ACE wrap.       apixaban 5 milliGRAM(s) every 12 hours  benzocaine 15 mG/menthol 3.6 mG Lozenge 1 Lozenge daily PRN  cyanocobalamin 1000 MICROGram(s) daily  dextrose 5%. 1000 milliLiter(s) <Continuous>  dextrose 50% Injectable 12.5 Gram(s) once  dextrose 50% Injectable 25 Gram(s) once  dextrose 50% Injectable 25 Gram(s) once  dextrose Gel 1 Dose(s) once PRN  dorzolamide 2% Ophthalmic Solution 1 Drop(s) three times a day  doxazosin 2 milliGRAM(s) daily  glucagon  Injectable 1 milliGRAM(s) once PRN  amLODIPine   Tablet 10 milliGRAM(s) daily  hydrALAZINE 25 milliGRAM(s) every 8 hours  lisinopril 40 milliGRAM(s) daily  torsemide 40 milliGRAM(s) daily  hydrALAZINE Injectable 10 milliGRAM(s) every 6 hours PRN  insulin lispro (HumaLOG) corrective regimen sliding scale   Before meals and at bedtime    potassium chloride    Tablet ER 40 milliEquivalent(s) every 4 hours  sodium chloride 0.65% Nasal 1 Spray(s) daily PRN        Allergies    tetanus-diphth toxoids (Td) adult/adol (Unknown)    Intolerances        T(C): , Max: 36.9 (04-13-18 @ 14:54)  T(F): , Max: 98.4 (04-13-18 @ 14:54)  HR: 74 (04-14-18 @ 08:44)  BP: 163/85 (04-14-18 @ 08:44)  BP(mean): 116 (04-13-18 @ 14:54)  RR: 16 (04-14-18 @ 08:44)  SpO2: 97% (04-14-18 @ 08:44)  Wt(kg): --    04-13 @ 07:01  -  04-14 @ 07:00  --------------------------------------------------------  IN:  Total IN: 0 mL    OUT:    Voided: 1650 mL  Total OUT: 1650 mL    Total NET: -1650 mL        Height (cm): 182.88 (04-13 @ 16:04)  Weight (kg): 144.4 (04-13 @ 16:04)  BMI (kg/m2): 43.2 (04-13 @ 16:04)  BSA (m2): 2.6 (04-13 @ 16:04)      LABS:                        9.6    7.1   )-----------( 137      ( 14 Apr 2018 07:56 )             29.9     04-14    140  |  102  |  30<H>  ----------------------------<  129<H>  3.3<L>   |  23  |  2.10<H>    Ca    8.4      14 Apr 2018 07:56  Phos  3.9     04-14  Mg     2.0     04-14    TPro  7.0  /  Alb  3.6  /  TBili  0.5  /  DBili  x   /  AST  15  /  ALT  8<L>  /  AlkPhos  60  04-13    Hemoglobin A1C, Whole Blood: 5.9 % <H> [4.0 - 5.6] (04-14 @ 07:56)    PT/INR - ( 13 Apr 2018 08:50 )   PT: 12.7 sec;   INR: 1.14          PTT - ( 13 Apr 2018 08:50 )  PTT:29.2 sec  Urinalysis Basic - ( 13 Apr 2018 12:05 )    Color: Yellow / Appearance: Clear / SG: >=1.030 / pH: x  Gluc: x / Ketone: NEGATIVE  / Bili: Negative / Urobili: 0.2 E.U./dL   Blood: x / Protein: >=300 mg/dL / Nitrite: NEGATIVE   Leuk Esterase: NEGATIVE / RBC: < 5 /HPF / WBC 5-10 /HPF   Sq Epi: x / Non Sq Epi: 0-5 /HPF / Bacteria: Present /HPF      Osmolality, Random Urine: 378 mosmol/kg [100 - 650] (04-13 @ 23:42)  Chloride, Random Urine: 113 mmol/L (04-13 @ 23:41)        RADIOLOGY & ADDITIONAL STUDIES:

## 2018-04-14 NOTE — PROGRESS NOTE ADULT - SUBJECTIVE AND OBJECTIVE BOX
Interventional Cardiology PA Adult Progress Note    Subjective Assessment: Pt. seen and examined at bedside this am. pt. comfortable, denies any CP, SOb, dizziness and palpitations. Pt. c/o B/L LE edema.     Overnight, pt. had multiple PVCs noted in tele.   	  MEDICATIONS:  amLODIPine   Tablet 10 milliGRAM(s) Oral daily  carvedilol 6.25 milliGRAM(s) Oral every 12 hours  doxazosin 2 milliGRAM(s) Oral daily  furosemide   Injectable 80 milliGRAM(s) IV Push once  lisinopril 40 milliGRAM(s) Oral daily  torsemide 40 milliGRAM(s) Oral daily  dextrose 50% Injectable 12.5 Gram(s) IV Push once  dextrose 50% Injectable 25 Gram(s) IV Push once  dextrose 50% Injectable 25 Gram(s) IV Push once  dextrose Gel 1 Dose(s) Oral once PRN  glucagon  Injectable 1 milliGRAM(s) IntraMuscular once PRN  insulin lispro (HumaLOG) corrective regimen sliding scale   SubCutaneous Before meals and at bedtime  apixaban 5 milliGRAM(s) Oral every 12 hours  benzocaine 15 mG/menthol 3.6 mG Lozenge 1 Lozenge Oral daily PRN  clotrimazole 1% Cream 1 Application(s) Topical two times a day  cyanocobalamin 1000 MICROGram(s) Oral daily  dextrose 5%. 1000 milliLiter(s) IV Continuous <Continuous>  dorzolamide 2% Ophthalmic Solution 1 Drop(s) Both EYES three times a day  nystatin Cream 1 Application(s) Topical two times a day  sodium chloride 0.65% Nasal 1 Spray(s) Both Nostrils daily PRN      	    [PHYSICAL EXAM:  TELEMETRY:  T(C): 36 (04-14-18 @ 13:20), Max: 36.9 (04-13-18 @ 14:54)  HR: 69 (04-14-18 @ 12:44) (64 - 90)  BP: 188/86 (04-14-18 @ 12:44) (138/95 - 238/105)  RR: 18 (04-14-18 @ 12:44) (16 - 18)  SpO2: 96% (04-14-18 @ 12:44) (94% - 99%)  Wt(kg): --  I&O's Summary    13 Apr 2018 07:01  -  14 Apr 2018 07:00  --------------------------------------------------------  IN: 0 mL / OUT: 1650 mL / NET: -1650 mL    14 Apr 2018 07:01  -  14 Apr 2018 14:25  --------------------------------------------------------  IN: 300 mL / OUT: 930 mL / NET: -630 mL      Height (cm): 182.88 (04-13 @ 16:04)  Weight (kg): 144.4 (04-13 @ 16:04)  BMI (kg/m2): 43.2 (04-13 @ 16:04)  BSA (m2): 2.6 (04-13 @ 16:04)  [place]:  Central/PICC/Mid Line:                                         General: NAD  Neck; JVD noted  CV: irregularly irregular, s1 and s2 present, no murmurs noted  Pulm: CTA B/L, no W/R/R  GI: soft, NT/ND, + BS X 4  extremeties: 4 + pitting edema b/l, bilateral chronic skin hyperpigmentation/redness from ankle to below the knee with small blisters. No warmth, no open wounds, no drainage, no tenderness. distal pulses doppler b/l  Neuro: AO X 3, poor vision b/l    	    ECG:  	  RADIOLOGY:   DIAGNOSTIC TESTING:  [ ] Echocardiogram:  [ ]  Catheterization:  [ ] Stress Test:    [ ] CARO  OTHER: 	    LABS:	 	  CARDIAC MARKERS:                                  9.6    7.1   )-----------( 137      ( 14 Apr 2018 07:56 )             29.9     04-14    140  |  102  |  30<H>  ----------------------------<  129<H>  3.3<L>   |  23  |  2.10<H>    Ca    8.4      14 Apr 2018 07:56  Phos  3.9     04-14  Mg     2.0     04-14    TPro  7.0  /  Alb  3.6  /  TBili  0.5  /  DBili  x   /  AST  15  /  ALT  8<L>  /  AlkPhos  60  04-13    proBNP:   Lipid Profile:   HgA1c: Hemoglobin A1C, Whole Blood: 5.9 % (04-14 @ 07:56)    TSH:   PT/INR - ( 13 Apr 2018 08:50 )   PT: 12.7 sec;   INR: 1.14          PTT - ( 13 Apr 2018 08:50 )  PTT:29.2 sec    ASSESSMENT/PLAN: 	        DVT ppx:  Dispo:

## 2018-04-15 LAB
ANION GAP SERPL CALC-SCNC: 14 MMOL/L — SIGNIFICANT CHANGE UP (ref 5–17)
BUN SERPL-MCNC: 32 MG/DL — HIGH (ref 7–23)
CALCIUM SERPL-MCNC: 8.6 MG/DL — SIGNIFICANT CHANGE UP (ref 8.4–10.5)
CHLORIDE SERPL-SCNC: 105 MMOL/L — SIGNIFICANT CHANGE UP (ref 96–108)
CHOLEST SERPL-MCNC: 153 MG/DL — SIGNIFICANT CHANGE UP (ref 10–199)
CO2 SERPL-SCNC: 25 MMOL/L — SIGNIFICANT CHANGE UP (ref 22–31)
CREAT SERPL-MCNC: 2.21 MG/DL — HIGH (ref 0.5–1.3)
FOLATE SERPL-MCNC: 12.8 NG/ML — SIGNIFICANT CHANGE UP (ref 4.8–24.2)
GLUCOSE BLDC GLUCOMTR-MCNC: 105 MG/DL — HIGH (ref 70–99)
GLUCOSE BLDC GLUCOMTR-MCNC: 145 MG/DL — HIGH (ref 70–99)
GLUCOSE BLDC GLUCOMTR-MCNC: 169 MG/DL — HIGH (ref 70–99)
GLUCOSE BLDC GLUCOMTR-MCNC: 201 MG/DL — HIGH (ref 70–99)
GLUCOSE SERPL-MCNC: 115 MG/DL — HIGH (ref 70–99)
HBA1C BLD-MCNC: 5.9 % — HIGH (ref 4–5.6)
HCT VFR BLD CALC: 32.3 % — LOW (ref 39–50)
HDLC SERPL-MCNC: 65 MG/DL — SIGNIFICANT CHANGE UP (ref 40–125)
HGB BLD-MCNC: 10.2 G/DL — LOW (ref 13–17)
LIPID PNL WITH DIRECT LDL SERPL: 79 MG/DL — SIGNIFICANT CHANGE UP
MAGNESIUM SERPL-MCNC: 2 MG/DL — SIGNIFICANT CHANGE UP (ref 1.6–2.6)
MCHC RBC-ENTMCNC: 26.2 PG — LOW (ref 27–34)
MCHC RBC-ENTMCNC: 31.6 G/DL — LOW (ref 32–36)
MCV RBC AUTO: 82.8 FL — SIGNIFICANT CHANGE UP (ref 80–100)
PLATELET # BLD AUTO: 145 K/UL — LOW (ref 150–400)
POTASSIUM SERPL-MCNC: 3.5 MMOL/L — SIGNIFICANT CHANGE UP (ref 3.5–5.3)
POTASSIUM SERPL-SCNC: 3.5 MMOL/L — SIGNIFICANT CHANGE UP (ref 3.5–5.3)
PTH-INTACT FLD-MCNC: 103 PG/ML — HIGH (ref 15–65)
RBC # BLD: 3.9 M/UL — LOW (ref 4.2–5.8)
RBC # FLD: 16.1 % — SIGNIFICANT CHANGE UP (ref 10.3–16.9)
SODIUM SERPL-SCNC: 144 MMOL/L — SIGNIFICANT CHANGE UP (ref 135–145)
TOTAL CHOLESTEROL/HDL RATIO MEASUREMENT: 2.4 RATIO — LOW (ref 3.4–9.6)
TRIGL SERPL-MCNC: 47 MG/DL — SIGNIFICANT CHANGE UP (ref 10–149)
VIT B12 SERPL-MCNC: 723 PG/ML — SIGNIFICANT CHANGE UP (ref 232–1245)
WBC # BLD: 6 K/UL — SIGNIFICANT CHANGE UP (ref 3.8–10.5)
WBC # FLD AUTO: 6 K/UL — SIGNIFICANT CHANGE UP (ref 3.8–10.5)

## 2018-04-15 PROCEDURE — 99232 SBSQ HOSP IP/OBS MODERATE 35: CPT | Mod: GC

## 2018-04-15 PROCEDURE — 99233 SBSQ HOSP IP/OBS HIGH 50: CPT

## 2018-04-15 RX ORDER — LABETALOL HCL 100 MG
10 TABLET ORAL ONCE
Qty: 0 | Refills: 0 | Status: COMPLETED | OUTPATIENT
Start: 2018-04-15 | End: 2018-04-15

## 2018-04-15 RX ORDER — POTASSIUM CHLORIDE 20 MEQ
40 PACKET (EA) ORAL ONCE
Qty: 0 | Refills: 0 | Status: COMPLETED | OUTPATIENT
Start: 2018-04-15 | End: 2018-04-15

## 2018-04-15 RX ORDER — CARVEDILOL PHOSPHATE 80 MG/1
12.5 CAPSULE, EXTENDED RELEASE ORAL EVERY 12 HOURS
Qty: 0 | Refills: 0 | Status: DISCONTINUED | OUTPATIENT
Start: 2018-04-15 | End: 2018-04-17

## 2018-04-15 RX ORDER — HYDRALAZINE HCL 50 MG
50 TABLET ORAL
Qty: 0 | Refills: 0 | Status: DISCONTINUED | OUTPATIENT
Start: 2018-04-15 | End: 2018-04-17

## 2018-04-15 RX ADMIN — Medication 1 APPLICATION(S): at 17:03

## 2018-04-15 RX ADMIN — BRIMONIDINE TARTRATE 1 DROP(S): 2 SOLUTION/ DROPS OPHTHALMIC at 17:04

## 2018-04-15 RX ADMIN — Medication 4: at 22:20

## 2018-04-15 RX ADMIN — Medication 1 DROP(S): at 17:04

## 2018-04-15 RX ADMIN — Medication 1 DROP(S): at 06:28

## 2018-04-15 RX ADMIN — BENZOCAINE AND MENTHOL 1 LOZENGE: 5; 1 LIQUID ORAL at 00:29

## 2018-04-15 RX ADMIN — Medication 40 MILLIGRAM(S): at 14:00

## 2018-04-15 RX ADMIN — DORZOLAMIDE HYDROCHLORIDE 1 DROP(S): 20 SOLUTION/ DROPS OPHTHALMIC at 14:31

## 2018-04-15 RX ADMIN — Medication 2: at 11:54

## 2018-04-15 RX ADMIN — AMLODIPINE BESYLATE 10 MILLIGRAM(S): 2.5 TABLET ORAL at 03:57

## 2018-04-15 RX ADMIN — DORZOLAMIDE HYDROCHLORIDE 1 DROP(S): 20 SOLUTION/ DROPS OPHTHALMIC at 22:20

## 2018-04-15 RX ADMIN — Medication 10 MILLIGRAM(S): at 00:45

## 2018-04-15 RX ADMIN — Medication 1 APPLICATION(S): at 06:16

## 2018-04-15 RX ADMIN — PREGABALIN 1000 MICROGRAM(S): 225 CAPSULE ORAL at 11:54

## 2018-04-15 RX ADMIN — APIXABAN 5 MILLIGRAM(S): 2.5 TABLET, FILM COATED ORAL at 17:03

## 2018-04-15 RX ADMIN — NYSTATIN CREAM 1 APPLICATION(S): 100000 CREAM TOPICAL at 06:16

## 2018-04-15 RX ADMIN — Medication 40 MILLIEQUIVALENT(S): at 11:54

## 2018-04-15 RX ADMIN — BRIMONIDINE TARTRATE 1 DROP(S): 2 SOLUTION/ DROPS OPHTHALMIC at 06:28

## 2018-04-15 RX ADMIN — LISINOPRIL 40 MILLIGRAM(S): 2.5 TABLET ORAL at 06:17

## 2018-04-15 RX ADMIN — Medication 2 MILLIGRAM(S): at 06:17

## 2018-04-15 RX ADMIN — Medication 50 MILLIGRAM(S): at 17:03

## 2018-04-15 RX ADMIN — CARVEDILOL PHOSPHATE 12.5 MILLIGRAM(S): 80 CAPSULE, EXTENDED RELEASE ORAL at 17:03

## 2018-04-15 RX ADMIN — NYSTATIN CREAM 1 APPLICATION(S): 100000 CREAM TOPICAL at 17:03

## 2018-04-15 RX ADMIN — APIXABAN 5 MILLIGRAM(S): 2.5 TABLET, FILM COATED ORAL at 06:16

## 2018-04-15 RX ADMIN — CARVEDILOL PHOSPHATE 6.25 MILLIGRAM(S): 80 CAPSULE, EXTENDED RELEASE ORAL at 06:16

## 2018-04-15 RX ADMIN — DORZOLAMIDE HYDROCHLORIDE 1 DROP(S): 20 SOLUTION/ DROPS OPHTHALMIC at 06:28

## 2018-04-15 NOTE — PROGRESS NOTE ADULT - PROBLEM SELECTOR PLAN 4
May be multifactorial  No role for SAM right now   TSat 10% Ferritin 103  If GIB is ruled out (stool guaiac / FIT test), then can prescribe ferrous sulfate 325mg POBID.

## 2018-04-15 NOTE — PROGRESS NOTE ADULT - SUBJECTIVE AND OBJECTIVE BOX
Patient seen and examined at bedside.     Received IV Lasix 80mg yesterday x 1 dosage  Has resulted in a good diuresis and patient is net negative  BP also improving though still remains elevated  He feels well otherwise  No dyspnea  Leg swelling still wrapped in ACE wrap     amLODIPine   Tablet 10 milliGRAM(s) daily  apixaban 5 milliGRAM(s) every 12 hours  benzocaine 15 mG/menthol 3.6 mG Lozenge 1 Lozenge daily PRN  brimonidine 0.2% Ophthalmic Solution 1 Drop(s) two times a day  carvedilol 6.25 milliGRAM(s) every 12 hours  clotrimazole 1% Cream 1 Application(s) two times a day  cyanocobalamin 1000 MICROGram(s) daily  dextrose 5%. 1000 milliLiter(s) <Continuous>  dextrose 50% Injectable 12.5 Gram(s) once  dextrose 50% Injectable 25 Gram(s) once  dextrose 50% Injectable 25 Gram(s) once  dextrose Gel 1 Dose(s) once PRN  dorzolamide 2% Ophthalmic Solution 1 Drop(s) three times a day  doxazosin 2 milliGRAM(s) daily  glucagon  Injectable 1 milliGRAM(s) once PRN  insulin lispro (HumaLOG) corrective regimen sliding scale   Before meals and at bedtime  lisinopril 40 milliGRAM(s) daily  nystatin Cream 1 Application(s) two times a day  sodium chloride 0.65% Nasal 1 Spray(s) daily PRN  timolol 0.5% Solution 1 Drop(s) two times a day      Allergies    tetanus-diphth toxoids (Td) adult/adol (Unknown)    Intolerances        T(C): , Max: 36.3 (04-14-18 @ 17:54)  T(F): , Max: 97.3 (04-14-18 @ 17:54)  HR: 72 (04-15-18 @ 08:44)  BP: 171/86 (04-15-18 @ 08:44)  BP(mean): --  RR: 18 (04-15-18 @ 08:44)  SpO2: 97% (04-15-18 @ 08:44)  Wt(kg): --    04-14 @ 07:01  -  04-15 @ 07:00  --------------------------------------------------------  IN:    Oral Fluid: 660 mL  Total IN: 660 mL    OUT:    Voided: 2630 mL  Total OUT: 2630 mL    Total NET: -1970 mL      04-15 @ 07:01  -  04-15 @ 12:51  --------------------------------------------------------  IN:    Oral Fluid: 300 mL  Total IN: 300 mL    OUT:  Total OUT: 0 mL    Total NET: 300 mL              LABS:                        10.2   6.0   )-----------( 145      ( 15 Apr 2018 07:09 )             32.3     04-15    144  |  105  |  32<H>  ----------------------------<  115<H>  3.5   |  25  |  2.21<H>    Ca    8.6      15 Apr 2018 07:08  Phos  3.9     04-14  Mg     2.0     04-15      Hemoglobin A1C, Whole Blood: 5.9 % <H> [4.0 - 5.6] (04-15 @ 07:09)  Cholesterol, Serum: 153 mg/dL [10 - 199] (04-15 @ 07:08)        Osmolality, Random Urine: 378 mosmol/kg [100 - 650] (04-13 @ 23:42)  Chloride, Random Urine: 113 mmol/L (04-13 @ 23:41)        RADIOLOGY & ADDITIONAL STUDIES:

## 2018-04-15 NOTE — PROGRESS NOTE ADULT - ATTENDING COMMENTS
CKD stable to improved with diuresis  would cont diuresis -- consider lasix 80 mg IV again qd/ BID  if PO torsemide inadequate   follow ana paula
CKD - cr improved  woud cont diuretics -- keep neg and follow weights to assure falling   replete K and follow lytes  consider aldactone

## 2018-04-15 NOTE — PROGRESS NOTE ADULT - PROBLEM SELECTOR PLAN 1
- B/L 4 + pitting edema noted in LE; + JVD in exam today; Lungs CTA B/L; b/l leg edema most likely 2/2 lymphedema  - s/p Lasix 80 mg PO X 1 in ED on admission  - s/p home torsemide 40 mg 4/14 and s/p Lasix 80 mg IV X 1 4/14 evening  - ECHO 4/13/18 revealed   severe concentric LVH, severely dilated LV, mild global hypokinesis of the left ventricle. LVEF  40-45%, dilated LA and RA, Mitral valve thickening noted. Moderate MR, mild to moderate TR. moderate pulmonary hypertension. PASP 55 mmHg,  mild pulmonic regurgitation  - monitor Cr.  2.31 on admission. Cr 2.210 today  -overnight 4/14 21 beats accelerated ventricular rhythm, pt is asymptomatic, consider increasing Coreg - B/L 4 + pitting edema noted in LE; + JVD in exam today; Lungs CTA B/L; b/l leg edema most likely 2/2 lymphedema  - s/p Lasix 80 mg PO X 1 in ED on admission  - s/p home torsemide 40 mg 4/14 and s/p Lasix 80 mg IV X 1 4/14 evening  - ECHO 4/13/18 revealed   severe concentric LVH, severely dilated LV, mild global hypokinesis of the left ventricle. LVEF  40-45%, dilated LA and RA, Mitral valve thickening noted. Moderate MR, mild to moderate TR. moderate pulmonary hypertension. PASP 55 mmHg,  mild pulmonic regurgitation  - monitor Cr.  2.31 on admission. Cr 2.210 today, f/u renal recs  -restarted pt's home dose torsemide 40 mg PO daily as per Dr. Blackman  -overnight 4/14 21 beats accelerated ventricular rhythm, pt is asymptomatic

## 2018-04-15 NOTE — PROGRESS NOTE ADULT - SUBJECTIVE AND OBJECTIVE BOX
Interventional Cardiology PA Adult Progress Note    Subjective Assessment: Pt. seen and examined at bedside this am. pt. comfortable, denies any CP, SOb, dizziness and palpitations. Pt. c/o B/L LE edema.       	  MEDICATIONS:  amLODIPine   Tablet 10 milliGRAM(s) Oral daily  carvedilol 6.25 milliGRAM(s) Oral every 12 hours  doxazosin 2 milliGRAM(s) Oral daily  lisinopril 40 milliGRAM(s) Oral daily            dextrose 50% Injectable 12.5 Gram(s) IV Push once  dextrose 50% Injectable 25 Gram(s) IV Push once  dextrose 50% Injectable 25 Gram(s) IV Push once  dextrose Gel 1 Dose(s) Oral once PRN  glucagon  Injectable 1 milliGRAM(s) IntraMuscular once PRN  insulin lispro (HumaLOG) corrective regimen sliding scale   SubCutaneous Before meals and at bedtime  apixaban 5 milliGRAM(s) Oral every 12 hours  benzocaine 15 mG/menthol 3.6 mG Lozenge 1 Lozenge Oral daily PRN  brimonidine 0.2% Ophthalmic Solution 1 Drop(s) Both EYES two times a day  clotrimazole 1% Cream 1 Application(s) Topical two times a day  cyanocobalamin 1000 MICROGram(s) Oral daily  dextrose 5%. 1000 milliLiter(s) IV Continuous <Continuous>  dorzolamide 2% Ophthalmic Solution 1 Drop(s) Both EYES three times a day  nystatin Cream 1 Application(s) Topical two times a day  potassium chloride    Tablet ER 40 milliEquivalent(s) Oral once  sodium chloride 0.65% Nasal 1 Spray(s) Both Nostrils daily PRN  timolol 0.5% Solution 1 Drop(s) Both EYES two times a day      	    [PHYSICAL EXAM:  TELEMETRY:  T(C): 36.1 (04-15-18 @ 09:04), Max: 36.3 (04-14-18 @ 17:54)  HR: 72 (04-15-18 @ 08:44) (57 - 78)  BP: 171/86 (04-15-18 @ 08:44) (167/77 - 204/95)  RR: 18 (04-15-18 @ 08:44) (15 - 18)  SpO2: 97% (04-15-18 @ 08:44) (96% - 99%)  Wt(kg): --  I&O's Summary    14 Apr 2018 07:01  -  15 Apr 2018 07:00  --------------------------------------------------------  IN: 660 mL / OUT: 2630 mL / NET: -1970 mL    15 Apr 2018 07:01  -  15 Apr 2018 10:55  --------------------------------------------------------  IN: 300 mL / OUT: 0 mL / NET: 300 mL        [place]:  Central/PICC/Mid Line:                                         General: NAD  Neck; JVD noted  CV: irregularly irregular, s1 and s2 present, no murmurs noted  Pulm: CTA B/L, no W/R/R  GI: soft, NT/ND, + BS X 4  extremities 4 + pitting edema b/l,  ACE is wrapped in both legs as per vascular  Neuro: AO X 3, poor vision b/l    	    ECG:  	  RADIOLOGY:   DIAGNOSTIC TESTING:  [ ] Echocardiogram:  [ ]  Catheterization:  [ ] Stress Test:    [ ] CARO  OTHER: 	    LABS:	 	  CARDIAC MARKERS:                                  10.2   6.0   )-----------( 145      ( 15 Apr 2018 07:09 )             32.3     04-15    144  |  105  |  32<H>  ----------------------------<  115<H>  3.5   |  25  |  2.21<H>    Ca    8.6      15 Apr 2018 07:08  Phos  3.9     04-14  Mg     2.0     04-15      proBNP:   Lipid Profile:   HgA1c: Hemoglobin A1C, Whole Blood: 5.9 % (04-15 @ 07:09)    TSH:       ASSESSMENT/PLAN: 	        DVT ppx:  Dispo: Interventional Cardiology PA Adult Progress Note    Subjective Assessment: Pt. seen and examined at bedside this am. pt. comfortable, denies any CP, SOb, dizziness and palpitations. Pt. c/o B/L LE edema.       	  MEDICATIONS:  amLODIPine   Tablet 10 milliGRAM(s) Oral daily  carvedilol 6.25 milliGRAM(s) Oral every 12 hours  doxazosin 2 milliGRAM(s) Oral daily  lisinopril 40 milliGRAM(s) Oral daily  dextrose 50% Injectable 12.5 Gram(s) IV Push once  dextrose 50% Injectable 25 Gram(s) IV Push once  dextrose 50% Injectable 25 Gram(s) IV Push once  dextrose Gel 1 Dose(s) Oral once PRN  glucagon  Injectable 1 milliGRAM(s) IntraMuscular once PRN  insulin lispro (HumaLOG) corrective regimen sliding scale   SubCutaneous Before meals and at bedtime  apixaban 5 milliGRAM(s) Oral every 12 hours  benzocaine 15 mG/menthol 3.6 mG Lozenge 1 Lozenge Oral daily PRN  brimonidine 0.2% Ophthalmic Solution 1 Drop(s) Both EYES two times a day  clotrimazole 1% Cream 1 Application(s) Topical two times a day  cyanocobalamin 1000 MICROGram(s) Oral daily  dextrose 5%. 1000 milliLiter(s) IV Continuous <Continuous>  dorzolamide 2% Ophthalmic Solution 1 Drop(s) Both EYES three times a day  nystatin Cream 1 Application(s) Topical two times a day  potassium chloride    Tablet ER 40 milliEquivalent(s) Oral once  sodium chloride 0.65% Nasal 1 Spray(s) Both Nostrils daily PRN  timolol 0.5% Solution 1 Drop(s) Both EYES two times a day      	    [PHYSICAL EXAM:  TELEMETRY:  T(C): 36.1 (04-15-18 @ 09:04), Max: 36.3 (04-14-18 @ 17:54)  HR: 72 (04-15-18 @ 08:44) (57 - 78)  BP: 171/86 (04-15-18 @ 08:44) (167/77 - 204/95)  RR: 18 (04-15-18 @ 08:44) (15 - 18)  SpO2: 97% (04-15-18 @ 08:44) (96% - 99%)  Wt(kg): --  I&O's Summary    14 Apr 2018 07:01  -  15 Apr 2018 07:00  --------------------------------------------------------  IN: 660 mL / OUT: 2630 mL / NET: -1970 mL    15 Apr 2018 07:01  -  15 Apr 2018 10:55  --------------------------------------------------------  IN: 300 mL / OUT: 0 mL / NET: 300 mL        [place]:  Central/PICC/Mid Line:                                         General: NAD  Neck; JVD noted  CV: irregularly irregular, s1 and s2 present, no murmurs noted  Pulm: CTA B/L, no W/R/R  GI: soft, NT/ND, + BS X 4  extremities 4 + pitting edema b/l,  ACE is wrapped in both legs as per vascular  Neuro: AO X 3, poor vision b/l    	    ECG:  	  RADIOLOGY:   DIAGNOSTIC TESTING:  [ ] Echocardiogram:  [ ]  Catheterization:  [ ] Stress Test:    [ ] CARO  OTHER: 	    LABS:	 	  CARDIAC MARKERS:                                  10.2   6.0   )-----------( 145      ( 15 Apr 2018 07:09 )             32.3     04-15    144  |  105  |  32<H>  ----------------------------<  115<H>  3.5   |  25  |  2.21<H>    Ca    8.6      15 Apr 2018 07:08  Phos  3.9     04-14  Mg     2.0     04-15      proBNP:   Lipid Profile:   HgA1c: Hemoglobin A1C, Whole Blood: 5.9 % (04-15 @ 07:09)    TSH:       ASSESSMENT/PLAN: 	        DVT ppx:  Dispo:

## 2018-04-15 NOTE — PROGRESS NOTE ADULT - PROBLEM SELECTOR PLAN 4
- BP on arrival to /113; SBP overnight on 4/14 is 190 s/p hydralazine 25 mg PO x1, Labetalol 10 mg IV x1, consider increasing Coreg  - Home atenolol d/c, c/w amlodipine 5 mg daily and lisinopril 40 mg daily as per Dr. Blackman.  pt. with multiple PVCs o/n, pt. started on Carvedilol 6.25 mg BID today.  - monitor BP. - BP on arrival to /113; SBP overnight on 4/14 is 190 s/p hydralazine 25 mg PO x1, Labetalol 10 mg IV x1   - Home atenolol d/c, c/w amlodipine 10mg daily and lisinopril 40 mg daily as per Dr. Blackman.  pt. with multiple PVCs o/n and 21 beats of accelerated ventricular rhythm overnight, increased the dose of Carvedilol 6.25 mg BID to 12.5 mg bid today. Added hydralazine 50 mg bid to the regimen as per Dr. Blackman.   - monitor BP.

## 2018-04-15 NOTE — PROGRESS NOTE ADULT - PROBLEM SELECTOR PLAN 1
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    Likely at a new stable baseline Cr right now     For CKD workup:  - Ignacio 101 UK 34 UCr 43 UCl 114  UUA 11.4 UOsm 398 [facility] 152/43  - Check Renal U/S and with Dopplers to rule out renal artery stenosis and renal vein thrombosis - ordered pending   - Check PTH and 25Vitamin D - please order - needs gold top tube and likely not able to be added on   - Although no objection to lisinopril 40mg POQD for nephroprotection in light of DM nephropathy

## 2018-04-16 DIAGNOSIS — I50.21 ACUTE SYSTOLIC (CONGESTIVE) HEART FAILURE: ICD-10-CM

## 2018-04-16 LAB
24R-OH-CALCIDIOL SERPL-MCNC: 12.4 NG/ML — LOW (ref 30–80)
24R-OH-CALCIDIOL SERPL-MCNC: 16.1 NG/ML — LOW (ref 30–80)
ALBUMIN SERPL ELPH-MCNC: 3.4 G/DL — SIGNIFICANT CHANGE UP (ref 3.3–5)
ALP SERPL-CCNC: 62 U/L — SIGNIFICANT CHANGE UP (ref 40–120)
ALT FLD-CCNC: 9 U/L — LOW (ref 10–45)
ANION GAP SERPL CALC-SCNC: 14 MMOL/L — SIGNIFICANT CHANGE UP (ref 5–17)
AST SERPL-CCNC: 14 U/L — SIGNIFICANT CHANGE UP (ref 10–40)
BILIRUB SERPL-MCNC: 0.7 MG/DL — SIGNIFICANT CHANGE UP (ref 0.2–1.2)
BUN SERPL-MCNC: 34 MG/DL — HIGH (ref 7–23)
CALCIUM SERPL-MCNC: 8.3 MG/DL — LOW (ref 8.4–10.5)
CALCIUM SERPL-MCNC: 8.6 MG/DL — SIGNIFICANT CHANGE UP (ref 8.4–10.5)
CALCIUM SERPL-MCNC: 8.8 MG/DL — SIGNIFICANT CHANGE UP (ref 8.4–10.5)
CHLORIDE SERPL-SCNC: 102 MMOL/L — SIGNIFICANT CHANGE UP (ref 96–108)
CO2 SERPL-SCNC: 28 MMOL/L — SIGNIFICANT CHANGE UP (ref 22–31)
CREAT SERPL-MCNC: 2.4 MG/DL — HIGH (ref 0.5–1.3)
GLUCOSE BLDC GLUCOMTR-MCNC: 138 MG/DL — HIGH (ref 70–99)
GLUCOSE BLDC GLUCOMTR-MCNC: 142 MG/DL — HIGH (ref 70–99)
GLUCOSE BLDC GLUCOMTR-MCNC: 165 MG/DL — HIGH (ref 70–99)
GLUCOSE BLDC GLUCOMTR-MCNC: 241 MG/DL — HIGH (ref 70–99)
GLUCOSE SERPL-MCNC: 147 MG/DL — HIGH (ref 70–99)
HCT VFR BLD CALC: 33.7 % — LOW (ref 39–50)
HGB BLD-MCNC: 10.3 G/DL — LOW (ref 13–17)
MAGNESIUM SERPL-MCNC: 1.9 MG/DL — SIGNIFICANT CHANGE UP (ref 1.6–2.6)
MCHC RBC-ENTMCNC: 25.6 PG — LOW (ref 27–34)
MCHC RBC-ENTMCNC: 30.6 G/DL — LOW (ref 32–36)
MCV RBC AUTO: 83.8 FL — SIGNIFICANT CHANGE UP (ref 80–100)
NT-PROBNP SERPL-SCNC: HIGH PG/ML (ref 0–300)
PHOSPHATE SERPL-MCNC: 4.2 MG/DL — SIGNIFICANT CHANGE UP (ref 2.5–4.5)
PLATELET # BLD AUTO: 159 K/UL — SIGNIFICANT CHANGE UP (ref 150–400)
POTASSIUM SERPL-MCNC: 3.8 MMOL/L — SIGNIFICANT CHANGE UP (ref 3.5–5.3)
POTASSIUM SERPL-SCNC: 3.8 MMOL/L — SIGNIFICANT CHANGE UP (ref 3.5–5.3)
PROT SERPL-MCNC: 6.6 G/DL — SIGNIFICANT CHANGE UP (ref 6–8.3)
PTH-INTACT FLD-MCNC: 124 PG/ML — HIGH (ref 15–65)
RBC # BLD: 4.02 M/UL — LOW (ref 4.2–5.8)
RBC # FLD: 15.4 % — SIGNIFICANT CHANGE UP (ref 10.3–16.9)
SODIUM SERPL-SCNC: 144 MMOL/L — SIGNIFICANT CHANGE UP (ref 135–145)
WBC # BLD: 8.1 K/UL — SIGNIFICANT CHANGE UP (ref 3.8–10.5)
WBC # FLD AUTO: 8.1 K/UL — SIGNIFICANT CHANGE UP (ref 3.8–10.5)

## 2018-04-16 PROCEDURE — 99232 SBSQ HOSP IP/OBS MODERATE 35: CPT | Mod: GC

## 2018-04-16 PROCEDURE — 99233 SBSQ HOSP IP/OBS HIGH 50: CPT

## 2018-04-16 RX ORDER — HYDRALAZINE HCL 50 MG
5 TABLET ORAL ONCE
Qty: 0 | Refills: 0 | Status: DISCONTINUED | OUTPATIENT
Start: 2018-04-16 | End: 2018-04-16

## 2018-04-16 RX ORDER — AMLODIPINE BESYLATE 2.5 MG/1
10 TABLET ORAL AT BEDTIME
Qty: 0 | Refills: 0 | Status: DISCONTINUED | OUTPATIENT
Start: 2018-04-16 | End: 2018-04-17

## 2018-04-16 RX ADMIN — Medication 2: at 11:41

## 2018-04-16 RX ADMIN — APIXABAN 5 MILLIGRAM(S): 2.5 TABLET, FILM COATED ORAL at 17:33

## 2018-04-16 RX ADMIN — DORZOLAMIDE HYDROCHLORIDE 1 DROP(S): 20 SOLUTION/ DROPS OPHTHALMIC at 22:02

## 2018-04-16 RX ADMIN — Medication 4: at 22:18

## 2018-04-16 RX ADMIN — DORZOLAMIDE HYDROCHLORIDE 1 DROP(S): 20 SOLUTION/ DROPS OPHTHALMIC at 06:51

## 2018-04-16 RX ADMIN — Medication 1 DROP(S): at 17:32

## 2018-04-16 RX ADMIN — BRIMONIDINE TARTRATE 1 DROP(S): 2 SOLUTION/ DROPS OPHTHALMIC at 06:51

## 2018-04-16 RX ADMIN — Medication 50 MILLIGRAM(S): at 17:33

## 2018-04-16 RX ADMIN — AMLODIPINE BESYLATE 10 MILLIGRAM(S): 2.5 TABLET ORAL at 06:52

## 2018-04-16 RX ADMIN — CARVEDILOL PHOSPHATE 12.5 MILLIGRAM(S): 80 CAPSULE, EXTENDED RELEASE ORAL at 17:33

## 2018-04-16 RX ADMIN — LISINOPRIL 40 MILLIGRAM(S): 2.5 TABLET ORAL at 06:52

## 2018-04-16 RX ADMIN — Medication 1 APPLICATION(S): at 06:58

## 2018-04-16 RX ADMIN — NYSTATIN CREAM 1 APPLICATION(S): 100000 CREAM TOPICAL at 17:32

## 2018-04-16 RX ADMIN — NYSTATIN CREAM 1 APPLICATION(S): 100000 CREAM TOPICAL at 06:58

## 2018-04-16 RX ADMIN — APIXABAN 5 MILLIGRAM(S): 2.5 TABLET, FILM COATED ORAL at 06:52

## 2018-04-16 RX ADMIN — PREGABALIN 1000 MICROGRAM(S): 225 CAPSULE ORAL at 11:42

## 2018-04-16 RX ADMIN — Medication 40 MILLIGRAM(S): at 06:52

## 2018-04-16 RX ADMIN — Medication 1 DROP(S): at 06:51

## 2018-04-16 RX ADMIN — Medication 2 MILLIGRAM(S): at 06:52

## 2018-04-16 RX ADMIN — BRIMONIDINE TARTRATE 1 DROP(S): 2 SOLUTION/ DROPS OPHTHALMIC at 17:31

## 2018-04-16 RX ADMIN — Medication 50 MILLIGRAM(S): at 06:51

## 2018-04-16 RX ADMIN — Medication 1 APPLICATION(S): at 17:31

## 2018-04-16 RX ADMIN — DORZOLAMIDE HYDROCHLORIDE 1 DROP(S): 20 SOLUTION/ DROPS OPHTHALMIC at 14:23

## 2018-04-16 RX ADMIN — CARVEDILOL PHOSPHATE 12.5 MILLIGRAM(S): 80 CAPSULE, EXTENDED RELEASE ORAL at 06:52

## 2018-04-16 NOTE — PROGRESS NOTE ADULT - PROBLEM SELECTOR PLAN 7
- vision loss b/l eyes 2/2 glaucoma  -s/p implant placed 2 m ago on the left eye as per pt  - c/w dorzemalide, combigan
- poor vision loss b/l eyes 2/2 glaucoma  - c/w dorzemalide, combigan
- poor vision loss b/l eyes 2/2 glaucoma  -s/p implant placed 2 m ago on the left eye as per pt  - c/w dorzemalide, combigan.

## 2018-04-16 NOTE — PROGRESS NOTE ADULT - PROBLEM SELECTOR PLAN 8
- c/w Doxazosin 2 mg BID.    DVT ppx: Eliquis   Dispo: Monitor BP with staggering of Norvasc to qhs and addition of Metolazone. LANCE recs sent to [facility] including lymphedema care. Case d/w Dr. Blackman.
- c/w Doxazosin 2 mg BID.    DVT PPX: Eliquis  Dispo: f/u Pt rec, revealuate pt. in am and diuress appropriately
Problem: BPH (benign prostatic hyperplasia).  Plan: - c/w Doxazosin 2 mg BID.    DVT PPX: Eliquis  Dispo: f/u Pt rec,

## 2018-04-16 NOTE — PROGRESS NOTE ADULT - SUBJECTIVE AND OBJECTIVE BOX
Patient seen and examined at bedside.     Chief COmplaint: Leg swelling, leg erythema, and hypertension    Interval events reviewed  Patient's BP trends have improved since admission when he was a peak 220/120s.  Reviewed with the patient that he may have been discharged with fewer diuretics from the former [facility] after being on torsemide 40mg BID and metolazone.     Otherwise the patient reports no dyspnea but persistent leg edema.     amLODIPine   Tablet 10 milliGRAM(s) daily  apixaban 5 milliGRAM(s) every 12 hours  benzocaine 15 mG/menthol 3.6 mG Lozenge 1 Lozenge daily PRN  brimonidine 0.2% Ophthalmic Solution 1 Drop(s) two times a day  carvedilol 12.5 milliGRAM(s) every 12 hours  clotrimazole 1% Cream 1 Application(s) two times a day  cyanocobalamin 1000 MICROGram(s) daily  dextrose 5%. 1000 milliLiter(s) <Continuous>  dextrose 50% Injectable 12.5 Gram(s) once  dextrose 50% Injectable 25 Gram(s) once  dextrose 50% Injectable 25 Gram(s) once  dextrose Gel 1 Dose(s) once PRN  dorzolamide 2% Ophthalmic Solution 1 Drop(s) three times a day  doxazosin 2 milliGRAM(s) daily  glucagon  Injectable 1 milliGRAM(s) once PRN  hydrALAZINE 50 milliGRAM(s) two times a day  insulin lispro (HumaLOG) corrective regimen sliding scale   Before meals and at bedtime  lisinopril 40 milliGRAM(s) daily  nystatin Cream 1 Application(s) two times a day  sodium chloride 0.65% Nasal 1 Spray(s) daily PRN  timolol 0.5% Solution 1 Drop(s) two times a day  torsemide 40 milliGRAM(s) daily      Allergies    tetanus-diphth toxoids (Td) adult/adol (Unknown)    Intolerances        T(C): , Max: 36.9 (04-16-18 @ 06:46)  T(F): , Max: 98.4 (04-16-18 @ 06:46)  HR: 68 (04-16-18 @ 08:30)  BP: 160/80 (04-16-18 @ 08:30)  BP(mean): --  RR: 18 (04-16-18 @ 08:30)  SpO2: 98% (04-16-18 @ 08:30)  Wt(kg): --    04-15 @ 07:01 - 04-16 @ 07:00  --------------------------------------------------------  IN:    Oral Fluid: 820 mL  Total IN: 820 mL    OUT:    Voided: 1500 mL  Total OUT: 1500 mL    Total NET: -680 mL      04-16 @ 07:01 - 04-16 @ 11:27  --------------------------------------------------------  IN:    Oral Fluid: 180 mL  Total IN: 180 mL    OUT:  Total OUT: 0 mL    Total NET: 180 mL              LABS:                        10.3   8.1   )-----------( 159      ( 16 Apr 2018 06:47 )             33.7     04-16    144  |  102  |  34<H>  ----------------------------<  147<H>  3.8   |  28  |  2.40<H>    Ca    8.8      16 Apr 2018 06:45  Phos  4.2     04-16  Mg     1.9     04-16    TPro  6.6  /  Alb  3.4  /  TBili  0.7  /  DBili  x   /  AST  14  /  ALT  9<L>  /  AlkPhos  62  04-16                RADIOLOGY & ADDITIONAL STUDIES:

## 2018-04-16 NOTE — PROGRESS NOTE ADULT - PROBLEM SELECTOR PLAN 1
-Diuresing well with improvement in b/l LE edema and no JVD on exam  -BNP down from 20k on admission to 10k   -Echo 4/13/18 showed severe concentric LVH, severely dilated LV, LVEF 40-45%, moderate MR, TR and pulm HTN(PASP 55mmHg)  -c/w Torsemide 40mg PO daily and addition of Metolazone 5mg qd as per renal

## 2018-04-16 NOTE — PROGRESS NOTE ADULT - PROBLEM SELECTOR PLAN 1
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    Given his persistent hypervolemia, it may be beneficial to increase diuresis and allow for a higher Cr baseline if it allows his legs to be less edematous. Aware his primary issue is venous insufficiency and lymphedema perhaps. But with CKD and hypertension, ECV overload is a superimposed issue    Recommend to c/w Torsemide 40mg POQD  But to start METOLAZONE 5mg POQD as well. Can give first dose metolazone today. But next dose to be given intercurrent with torsemide tomorrow.     For CKD workup:  - Ignacio 101 UK 34 UCr 43 UCl 114  UUA 11.4 UOsm 398 /43  - Check Renal U/S and with Dopplers to rule out renal artery stenosis and renal vein thrombosis - ordered pending

## 2018-04-16 NOTE — CHART NOTE - NSCHARTNOTEFT_GEN_A_CORE
pt seen at approx 1915 per RN request for "confusion". Pt recently started on Metolazone 5mg PO daily receiving first dose at 1800. Pt AAOx3 stating he wants to go out to "[place] to sleep". VSS, HR 70s, BP 150s/70s no other focal deficits. As d/w Dr. Blackman STAT CT Head non-con ordered. Pt placed on enhanced supervision for precautionary measures.

## 2018-04-16 NOTE — PROGRESS NOTE ADULT - SUBJECTIVE AND OBJECTIVE BOX
Interventional Cardiology PA Adult Progress Note    Subjective Assessment: Pt seen this AM in NAD eating breakfast. Reports his RLE edema is improving. Denies any CP, SOB, palpitations, PND, Orthopnea.  12 point ROS otherwise negative  	  MEDICATIONS:  amLODIPine   Tablet 10 milliGRAM(s) Oral at bedtime  carvedilol 12.5 milliGRAM(s) Oral every 12 hours  doxazosin 2 milliGRAM(s) Oral daily  hydrALAZINE 50 milliGRAM(s) Oral two times a day  lisinopril 40 milliGRAM(s) Oral daily  metolazone 5 milliGRAM(s) Oral daily  torsemide 40 milliGRAM(s) Oral daily            dextrose 50% Injectable 12.5 Gram(s) IV Push once  dextrose 50% Injectable 25 Gram(s) IV Push once  dextrose 50% Injectable 25 Gram(s) IV Push once  dextrose Gel 1 Dose(s) Oral once PRN  glucagon  Injectable 1 milliGRAM(s) IntraMuscular once PRN  insulin lispro (HumaLOG) corrective regimen sliding scale   SubCutaneous Before meals and at bedtime    apixaban 5 milliGRAM(s) Oral every 12 hours  benzocaine 15 mG/menthol 3.6 mG Lozenge 1 Lozenge Oral daily PRN  brimonidine 0.2% Ophthalmic Solution 1 Drop(s) Both EYES two times a day  clotrimazole 1% Cream 1 Application(s) Topical two times a day  cyanocobalamin 1000 MICROGram(s) Oral daily  dextrose 5%. 1000 milliLiter(s) IV Continuous <Continuous>  dorzolamide 2% Ophthalmic Solution 1 Drop(s) Both EYES three times a day  nystatin Cream 1 Application(s) Topical two times a day  sodium chloride 0.65% Nasal 1 Spray(s) Both Nostrils daily PRN  timolol 0.5% Solution 1 Drop(s) Both EYES two times a day      	    [PHYSICAL EXAM:  TELEMETRY: AF PVCs 60s  T(C): 36.3 (04-16-18 @ 15:01), Max: 36.9 (04-16-18 @ 06:46)  HR: 68 (04-16-18 @ 08:30) (68 - 76)  BP: 160/80 (04-16-18 @ 08:30) (160/80 - 200/100)  RR: 18 (04-16-18 @ 08:30) (18 - 18)  SpO2: 98% (04-16-18 @ 08:30) (98% - 98%)  Wt(kg): --  I&O's Summary    15 Apr 2018 07:01  -  16 Apr 2018 07:00  --------------------------------------------------------  IN: 820 mL / OUT: 1500 mL / NET: -680 mL    16 Apr 2018 07:01  -  16 Apr 2018 17:06  --------------------------------------------------------  IN: 180 mL / OUT: 200 mL / NET: -20 mL            Appearance: Normal	  HEENT:   b/l eye blindness  Neck: Supple, - JVD  Cardiovascular: irregularly irregular Normal S1 S2  Respiratory: Lungs clear to auscultation  Gastrointestinal:  Soft, Non-tender, + BS	  Skin: No rashes, No ecchymoses, No cyanosis  Extremities: + ACE wraps to b/l LE with 3+ edema  Vascular: Peripheral pulses palpable 2+ bilaterally  Neurologic: Non-focal  Psychiatry: A & O x 3, Mood & affect appropriate      	    ECG:  	  RADIOLOGY:   DIAGNOSTIC TESTING:  [ ] Echocardiogram:  [ ]  Catheterization:  [ ] Stress Test:    [ ] CARO  OTHER: 	    LABS:	 	  CARDIAC MARKERS:                                  10.3   8.1   )-----------( 159      ( 16 Apr 2018 06:47 )             33.7     04-16    144  |  102  |  34<H>  ----------------------------<  147<H>  3.8   |  28  |  2.40<H>    Ca    8.8      16 Apr 2018 06:45  Phos  4.2     04-16  Mg     1.9     04-16    TPro  6.6  /  Alb  3.4  /  TBili  0.7  /  DBili  x   /  AST  14  /  ALT  9<L>  /  AlkPhos  62  04-16    proBNP: Serum Pro-Brain Natriuretic Peptide: 71162 pg/mL (04-16 @ 06:45)    Lipid Profile:   HgA1c:   TSH:         DVT ppx: Eliquis     Dispo: Monitor BP with staggering of Norvasc to qhs and addition of Metolazone. LANCE recs sent to [facility] including lymphedema care.

## 2018-04-16 NOTE — PROGRESS NOTE ADULT - PROBLEM SELECTOR PLAN 4
-BP elevated on arrival to 200s/100s adjusted meds and this /100  -Addition of Metolazone 5mg today and spaced out BP meds with Norvasc 10mg daily staggered to qhs to help with AM BP   - c/w Coreg 12.5mg BID, Hydralazine 50mg BID, Lisinopril 40mg qd

## 2018-04-17 ENCOUNTER — TRANSCRIPTION ENCOUNTER (OUTPATIENT)
Age: 68
End: 2018-04-17

## 2018-04-17 VITALS
DIASTOLIC BLOOD PRESSURE: 78 MMHG | HEART RATE: 66 BPM | RESPIRATION RATE: 18 BRPM | TEMPERATURE: 97 F | SYSTOLIC BLOOD PRESSURE: 158 MMHG | OXYGEN SATURATION: 95 %

## 2018-04-17 LAB
ANION GAP SERPL CALC-SCNC: 13 MMOL/L — SIGNIFICANT CHANGE UP (ref 5–17)
BUN SERPL-MCNC: 37 MG/DL — HIGH (ref 7–23)
CALCIUM SERPL-MCNC: 8.3 MG/DL — LOW (ref 8.4–10.5)
CHLORIDE SERPL-SCNC: 100 MMOL/L — SIGNIFICANT CHANGE UP (ref 96–108)
CO2 SERPL-SCNC: 26 MMOL/L — SIGNIFICANT CHANGE UP (ref 22–31)
CREAT SERPL-MCNC: 2.34 MG/DL — HIGH (ref 0.5–1.3)
GLUCOSE BLDC GLUCOMTR-MCNC: 124 MG/DL — HIGH (ref 70–99)
GLUCOSE BLDC GLUCOMTR-MCNC: 220 MG/DL — HIGH (ref 70–99)
GLUCOSE SERPL-MCNC: 127 MG/DL — HIGH (ref 70–99)
HCT VFR BLD CALC: 30.2 % — LOW (ref 39–50)
HGB BLD-MCNC: 9.3 G/DL — LOW (ref 13–17)
MAGNESIUM SERPL-MCNC: 2 MG/DL — SIGNIFICANT CHANGE UP (ref 1.6–2.6)
MCHC RBC-ENTMCNC: 26.1 PG — LOW (ref 27–34)
MCHC RBC-ENTMCNC: 30.8 G/DL — LOW (ref 32–36)
MCV RBC AUTO: 84.6 FL — SIGNIFICANT CHANGE UP (ref 80–100)
PLATELET # BLD AUTO: 145 K/UL — LOW (ref 150–400)
POTASSIUM SERPL-MCNC: 3.1 MMOL/L — LOW (ref 3.5–5.3)
POTASSIUM SERPL-SCNC: 3.1 MMOL/L — LOW (ref 3.5–5.3)
RBC # BLD: 3.57 M/UL — LOW (ref 4.2–5.8)
RBC # FLD: 15.3 % — SIGNIFICANT CHANGE UP (ref 10.3–16.9)
SODIUM SERPL-SCNC: 139 MMOL/L — SIGNIFICANT CHANGE UP (ref 135–145)
WBC # BLD: 6.4 K/UL — SIGNIFICANT CHANGE UP (ref 3.8–10.5)
WBC # FLD AUTO: 6.4 K/UL — SIGNIFICANT CHANGE UP (ref 3.8–10.5)

## 2018-04-17 PROCEDURE — 97161 PT EVAL LOW COMPLEX 20 MIN: CPT

## 2018-04-17 PROCEDURE — 76770 US EXAM ABDO BACK WALL COMP: CPT

## 2018-04-17 PROCEDURE — 83550 IRON BINDING TEST: CPT

## 2018-04-17 PROCEDURE — 87086 URINE CULTURE/COLONY COUNT: CPT

## 2018-04-17 PROCEDURE — 93306 TTE W/DOPPLER COMPLETE: CPT

## 2018-04-17 PROCEDURE — 83970 ASSAY OF PARATHORMONE: CPT

## 2018-04-17 PROCEDURE — 82607 VITAMIN B-12: CPT

## 2018-04-17 PROCEDURE — 82728 ASSAY OF FERRITIN: CPT

## 2018-04-17 PROCEDURE — 76770 US EXAM ABDO BACK WALL COMP: CPT | Mod: 26,59

## 2018-04-17 PROCEDURE — 85025 COMPLETE CBC W/AUTO DIFF WBC: CPT

## 2018-04-17 PROCEDURE — 93976 VASCULAR STUDY: CPT | Mod: 26

## 2018-04-17 PROCEDURE — 80048 BASIC METABOLIC PNL TOTAL CA: CPT

## 2018-04-17 PROCEDURE — 93005 ELECTROCARDIOGRAM TRACING: CPT

## 2018-04-17 PROCEDURE — 99285 EMERGENCY DEPT VISIT HI MDM: CPT | Mod: 25

## 2018-04-17 PROCEDURE — 83735 ASSAY OF MAGNESIUM: CPT

## 2018-04-17 PROCEDURE — 85027 COMPLETE CBC AUTOMATED: CPT

## 2018-04-17 PROCEDURE — 97116 GAIT TRAINING THERAPY: CPT

## 2018-04-17 PROCEDURE — 85610 PROTHROMBIN TIME: CPT

## 2018-04-17 PROCEDURE — 99232 SBSQ HOSP IP/OBS MODERATE 35: CPT | Mod: GC

## 2018-04-17 PROCEDURE — 93970 EXTREMITY STUDY: CPT

## 2018-04-17 PROCEDURE — 80053 COMPREHEN METABOLIC PANEL: CPT

## 2018-04-17 PROCEDURE — 82306 VITAMIN D 25 HYDROXY: CPT

## 2018-04-17 PROCEDURE — 70450 CT HEAD/BRAIN W/O DYE: CPT

## 2018-04-17 PROCEDURE — 84560 ASSAY OF URINE/URIC ACID: CPT

## 2018-04-17 PROCEDURE — 84550 ASSAY OF BLOOD/URIC ACID: CPT

## 2018-04-17 PROCEDURE — 71046 X-RAY EXAM CHEST 2 VIEWS: CPT

## 2018-04-17 PROCEDURE — 84156 ASSAY OF PROTEIN URINE: CPT

## 2018-04-17 PROCEDURE — 80061 LIPID PANEL: CPT

## 2018-04-17 PROCEDURE — 83036 HEMOGLOBIN GLYCOSYLATED A1C: CPT

## 2018-04-17 PROCEDURE — 82310 ASSAY OF CALCIUM: CPT

## 2018-04-17 PROCEDURE — 84300 ASSAY OF URINE SODIUM: CPT

## 2018-04-17 PROCEDURE — 82962 GLUCOSE BLOOD TEST: CPT

## 2018-04-17 PROCEDURE — 82436 ASSAY OF URINE CHLORIDE: CPT

## 2018-04-17 PROCEDURE — 84100 ASSAY OF PHOSPHORUS: CPT

## 2018-04-17 PROCEDURE — 96374 THER/PROPH/DIAG INJ IV PUSH: CPT

## 2018-04-17 PROCEDURE — 84133 ASSAY OF URINE POTASSIUM: CPT

## 2018-04-17 PROCEDURE — 70450 CT HEAD/BRAIN W/O DYE: CPT | Mod: 26

## 2018-04-17 PROCEDURE — 82570 ASSAY OF URINE CREATININE: CPT

## 2018-04-17 PROCEDURE — 85730 THROMBOPLASTIN TIME PARTIAL: CPT

## 2018-04-17 PROCEDURE — 99239 HOSP IP/OBS DSCHRG MGMT >30: CPT

## 2018-04-17 PROCEDURE — 93976 VASCULAR STUDY: CPT

## 2018-04-17 PROCEDURE — 36415 COLL VENOUS BLD VENIPUNCTURE: CPT

## 2018-04-17 PROCEDURE — 81001 URINALYSIS AUTO W/SCOPE: CPT

## 2018-04-17 PROCEDURE — 83880 ASSAY OF NATRIURETIC PEPTIDE: CPT

## 2018-04-17 PROCEDURE — 84540 ASSAY OF URINE/UREA-N: CPT

## 2018-04-17 PROCEDURE — 82746 ASSAY OF FOLIC ACID SERUM: CPT

## 2018-04-17 PROCEDURE — 83935 ASSAY OF URINE OSMOLALITY: CPT

## 2018-04-17 RX ORDER — SPIRONOLACTONE 25 MG/1
1 TABLET, FILM COATED ORAL
Qty: 0 | Refills: 0 | COMMUNITY

## 2018-04-17 RX ORDER — APIXABAN 2.5 MG/1
1 TABLET, FILM COATED ORAL
Qty: 0 | Refills: 0 | COMMUNITY

## 2018-04-17 RX ORDER — APIXABAN 2.5 MG/1
1 TABLET, FILM COATED ORAL
Qty: 0 | Refills: 0 | COMMUNITY
Start: 2018-04-17

## 2018-04-17 RX ORDER — ATENOLOL 25 MG/1
0 TABLET ORAL
Qty: 0 | Refills: 0 | COMMUNITY

## 2018-04-17 RX ORDER — CARVEDILOL PHOSPHATE 80 MG/1
1 CAPSULE, EXTENDED RELEASE ORAL
Qty: 0 | Refills: 0 | COMMUNITY
Start: 2018-04-17

## 2018-04-17 RX ORDER — POTASSIUM CHLORIDE 20 MEQ
10 PACKET (EA) ORAL
Qty: 0 | Refills: 0 | Status: COMPLETED | OUTPATIENT
Start: 2018-04-17 | End: 2018-04-17

## 2018-04-17 RX ORDER — AMLODIPINE BESYLATE 2.5 MG/1
1 TABLET ORAL
Qty: 0 | Refills: 0 | COMMUNITY
Start: 2018-04-17

## 2018-04-17 RX ORDER — POTASSIUM CHLORIDE 20 MEQ
40 PACKET (EA) ORAL ONCE
Qty: 0 | Refills: 0 | Status: COMPLETED | OUTPATIENT
Start: 2018-04-17 | End: 2018-04-17

## 2018-04-17 RX ORDER — HYDRALAZINE HCL 50 MG
1 TABLET ORAL
Qty: 0 | Refills: 0 | COMMUNITY
Start: 2018-04-17

## 2018-04-17 RX ADMIN — Medication 100 MILLIEQUIVALENT(S): at 07:48

## 2018-04-17 RX ADMIN — CARVEDILOL PHOSPHATE 12.5 MILLIGRAM(S): 80 CAPSULE, EXTENDED RELEASE ORAL at 06:31

## 2018-04-17 RX ADMIN — AMLODIPINE BESYLATE 10 MILLIGRAM(S): 2.5 TABLET ORAL at 00:00

## 2018-04-17 RX ADMIN — DORZOLAMIDE HYDROCHLORIDE 1 DROP(S): 20 SOLUTION/ DROPS OPHTHALMIC at 14:19

## 2018-04-17 RX ADMIN — Medication 40 MILLIEQUIVALENT(S): at 07:38

## 2018-04-17 RX ADMIN — Medication 1 DROP(S): at 06:34

## 2018-04-17 RX ADMIN — PREGABALIN 1000 MICROGRAM(S): 225 CAPSULE ORAL at 12:33

## 2018-04-17 RX ADMIN — BRIMONIDINE TARTRATE 1 DROP(S): 2 SOLUTION/ DROPS OPHTHALMIC at 06:35

## 2018-04-17 RX ADMIN — Medication 40 MILLIGRAM(S): at 06:32

## 2018-04-17 RX ADMIN — Medication 100 MILLIEQUIVALENT(S): at 14:17

## 2018-04-17 RX ADMIN — Medication 2 MILLIGRAM(S): at 06:31

## 2018-04-17 RX ADMIN — Medication 4: at 12:32

## 2018-04-17 RX ADMIN — LISINOPRIL 40 MILLIGRAM(S): 2.5 TABLET ORAL at 06:31

## 2018-04-17 RX ADMIN — DORZOLAMIDE HYDROCHLORIDE 1 DROP(S): 20 SOLUTION/ DROPS OPHTHALMIC at 06:34

## 2018-04-17 RX ADMIN — APIXABAN 5 MILLIGRAM(S): 2.5 TABLET, FILM COATED ORAL at 06:31

## 2018-04-17 RX ADMIN — NYSTATIN CREAM 1 APPLICATION(S): 100000 CREAM TOPICAL at 06:34

## 2018-04-17 RX ADMIN — Medication 50 MILLIGRAM(S): at 06:31

## 2018-04-17 RX ADMIN — Medication 100 MILLIEQUIVALENT(S): at 12:33

## 2018-04-17 RX ADMIN — Medication 1 APPLICATION(S): at 06:34

## 2018-04-17 NOTE — PROGRESS NOTE ADULT - PROBLEM SELECTOR PROBLEM 5
CKD (chronic kidney disease), stage IV
Chronic kidney disease-mineral and bone disorder

## 2018-04-17 NOTE — PROGRESS NOTE ADULT - PROBLEM SELECTOR PLAN 3
- Patient has h/o chronic venous insufficiency.   - Duplex of LE 4/13/18  was negative for DVT above knees, b/l enlarged inguinal lymph nodes, b/l popliteal cyst, paired peroneal and posterior tibial calf veins were not visualized 2/2 to overlying soft tissue edema.  - Vascular team consulted; recommends daily compression with ACE and elevation above the heart and to Follow up with Dr. Bhagat as outpatient in 1-2 weeks after discharge to arrange for a lymphedema pump for home to reduce chronic edema  - consult wound team to evaluate pt b/l legs.
Calcium 8.2 - Alb 3.6 - corrected is low normal  PTH - 103 25Vit D 16.1 - no specific interventions right now. This PTH may be appropriate for his level of CKD.   Low phosphorus diet     Mg 2.0  Ph 3.9
- Patient has h/o chronic venous insufficiency.   - Duplex of LE 4/13/18  was negative for DVT above knees, b/l enlarged inguinal lymph nodes, b/l popliteal cyst, paired peroneal and posterior tibial calf veins were not visualized 2/2 to overlying soft tissue edema.  - Vascular team consulted; recommends daily compression with ACE and elevation above the heart and to Follow up with Dr. Bhagat as outpatient in 1-2 weeks after discharge to arrange for a lymphedema pump for home to reduce chronic edema  - consult wound team to evaluate pt b/l legs.
Calcium 8.2 - Alb 3.6 - corrected is low normal  PTH - 103 25Vit D 16.1 - no specific interventions right now. This PTH may be appropriate for his level of CKD.   Low phosphorus diet     Mg 2.0  Ph 3.9
Calcium 8.2 - Alb 3.6 - corrected is low normal  Check PTH and 25Vit D    Mg 2.0  Ph 3.9
- Patient has h/o chronic venous insufficiency.   - Duplex of LE 4/13/18  was negative for DVT  - Vascular team consulted; recommends daily compression with ACE and elevation above the heart  -f/u Dr. Bhagat as outpatient in 1-2 weeks after discharge to arrange for a lymphedema pump for home to reduce chronic edema  -wound care consulted f/u recs
Calcium 8.2 - Alb 3.6 - corrected is low normal  Check PTH and 25Vit D    Mg 2.0  Ph 3.9

## 2018-04-17 NOTE — PROGRESS NOTE ADULT - PROBLEM SELECTOR PLAN 6
-c/w FS/ISS  -Hgb A1C 5.9
Care per vascular  Diuresis may not effectively address this.
- ISS
- ISS.  -Hgb A1C 5.9
Care per vascular  Diuresis may not effectively address this but worthwhile to pursue
Care per vascular  Diuresis may not effectively address this.
Care per vascular  Diuresis may not effectively address this but worthwhile to pursue

## 2018-04-17 NOTE — DISCHARGE NOTE ADULT - MEDICATION SUMMARY - MEDICATIONS TO CHANGE
I will SWITCH the dose or number of times a day I take the medications listed below when I get home from the hospital:    Eliquis 2.5 mg oral tablet  -- 1 tab(s) by mouth 2 times a day    Eliquis 2.5 mg oral tablet  -- 1 tab(s) by mouth 2 times a day

## 2018-04-17 NOTE — PROGRESS NOTE ADULT - PROBLEM SELECTOR PROBLEM 3
Chronic venous insufficiency
Electrolyte and fluid disorder
Chronic venous insufficiency
Electrolyte and fluid disorder
Electrolyte and fluid disorder
Chronic venous insufficiency
Electrolyte and fluid disorder

## 2018-04-17 NOTE — PROGRESS NOTE ADULT - ASSESSMENT
66 y/o M NON- COMPLIANT WITH MEDS/POOR MEDICAL F/U with PMH of  afib (on eliquis; last dose a month ago) HTN, DM with peripheral nephropathy, CKD stage 4 ( Cr 2.31 today; baseline cr 1.7), PAD, chronic venous insufficiency, chronic cellulitis , chronic B/L LE edema,  + MRSA right foot ulcer(June 2017; tx with antibiotics), BPH, poor vision in B/L eyes 2/2 glaucoma  who is now admitted to 5 uris for further management of B/L LE edema and BP management.
67M hx of CKD3/4 (the Cr trends and baseline are above) presumably due to long standing DM (has 35 year history of DM and reports has DM retinopathy history), uncontrolled HTN, Afib (on eliquis ; last dose a month ago) PAD, chronic venous insufficiency, chronic cellulitis , chronic B/L LE edema,  + MRSA right foot ulcer(June 2017; tx with antibiotics), BPH, poor vision in b/l eyes 2/2 glaucoma who now presents to [facility] ED c/o worsening B/L LE swelling X 1 week. Renal consulted for CKD management, volume management, and HTN management.
68 y/o M NON- COMPLIANT WITH MEDS/POOR MEDICAL F/U with PMH of  afib (on eliquis; last dose a month ago) HTN, DM with peripheral nephropathy, CKD stage 4 ( Cr 2.31 today; baseline cr 1.7), PAD, chronic venous insufficiency, chronic cellulitis , chronic B/L LE edema,  + MRSA right foot ulcer(June 2017; tx with antibiotics), BPH, poor vision in B/L eyes 2/2 glaucoma  who is now admitted to 5 uris for further management of B/L LE edema and BP management.
67M hx of CKD3/4 (the Cr trends and baseline are above) presumably due to long standing DM (has 35 year history of DM and reports has DM retinopathy history), uncontrolled HTN, Afib (on eliquis ; last dose a month ago) PAD, chronic venous insufficiency, chronic cellulitis , chronic B/L LE edema,  + MRSA right foot ulcer(June 2017; tx with antibiotics), BPH, poor vision in b/l eyes 2/2 glaucoma who now presents to [facility] ED c/o worsening B/L LE swelling X 1 week. Renal consulted for CKD management, volume management, and HTN management.
67M hx of CKD3/4 (the Cr trends and baseline are above) presumably due to long standing DM (has 35 year history of DM and reports has DM retinopathy history), uncontrolled HTN, Afib (on eliquis ; last dose a month ago) PAD, chronic venous insufficiency, chronic cellulitis , chronic B/L LE edema,  + MRSA right foot ulcer(June 2017; tx with antibiotics), BPH, poor vision in b/l eyes 2/2 glaucoma who now presents to [facility] ED c/o worsening B/L LE swelling X 1 week. Renal consulted for CKD management, volume management, and HTN management.
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
67M hx of CKD3/4 (the Cr trends and baseline are above) presumably due to long standing DM (has 35 year history of DM and reports has DM retinopathy history), uncontrolled HTN, Afib (on eliquis ; last dose a month ago) PAD, chronic venous insufficiency, chronic cellulitis , chronic B/L LE edema,  + MRSA right foot ulcer(June 2017; tx with antibiotics), BPH, poor vision in b/l eyes 2/2 glaucoma who now presents to [facility] ED c/o worsening B/L LE swelling X 1 week. Renal consulted for CKD management, volume management, and HTN management.

## 2018-04-17 NOTE — DISCHARGE NOTE ADULT - PLAN OF CARE
You have a weak heart and need to take Torsemide 40mg daily, Metolazone 5mg daily(prior to Torsemide dose), Aldactone 25mg once a day. You have a history of heart failure and should continue your daily lasix, aldactone, metolazone. You should weigh yourself daily and if you notice a weight gain of more than 2-3 pounds in 2 days, shortness of breath, or lower extremity swelling you should contact your doctor immediately. Please restrict your fluid intake to 1-2L daily. Follow up with Dr. Martinez on April 27th, 2018 at 2pm. You have an irregular heart rhythm and need to continue Eliquis 5mg every 12 hours for stroke prevention Follow up with Dr. Martinez on April 27th, 2018 at 2pm. Continue with ACE bandage wraps daily as well as lymphedema pump to help decrease swelling. Follow up with Dr. Bhagat in 1-2 weeks. Continue Amlodipine 10mg at bedtime, Carvedilol 12.5mg every 12 hours, Hydralazine 50mg every 12 hours, Lisinopril 40mg once a day Have your kidney function checked weekly and follow up with Dr. Kelly in 1 week. Continue home Glipizide 5mg daily and check fingersticks regularly Continue your home Doxazosin 2mg once a day Also continue your home glaucoma drops b/l as prescribed.

## 2018-04-17 NOTE — PROGRESS NOTE ADULT - PROBLEM SELECTOR PROBLEM 4
HTN (hypertension)
HTN (hypertension)
Anemia due to chronic kidney disease
HTN (hypertension)
Anemia due to chronic kidney disease

## 2018-04-17 NOTE — DISCHARGE NOTE ADULT - CARE PROVIDER_API CALL
Preet Martinez), Cardiovascular Disease; Internal Medicine; Nuclear Cardiology  [address] [place] [address]  Phone: [phone]  Fax: [phone]    Ethan Pineda), Internal Medicine  [address]  [address] [place] [address]  Phone: [phone]  Fax: [phone]    Norris Bhagat), Surgery; Vascular Surgery  [address]  [address] [place] [address]  Phone: [phone]  Fax: [phone]

## 2018-04-17 NOTE — PROGRESS NOTE ADULT - PROBLEM SELECTOR PLAN 2
- Rate controlled @ 70-80 bpm  - home Eliquis 2.5 mg BID (last dose month ago) increased to eliquis 5 mg BID as d/w Dr. Blackman
Improved BP. This is likely contributing to the improved renal perfusion and better r today. But remains above goal     Continue with prescribed:  amLODIPine   Tablet 10 milliGRAM(s) daily  coreg 6.25mg POBID  lisinopril 40 milliGRAM(s) daily  Torsemide 40mg POQD  Metolazone 5mg POQD  Add on Aldactone 25mg POQD as above     Overall improving  Still needs diuresis to improve the BP
Improved BP. This is likely contributing to the improved renal perfusion and better r today    Continue with prescribed:  amLODIPine   Tablet 10 milliGRAM(s) daily  coreg 6.25mg POBID  lisinopril 40 milliGRAM(s) daily  Torsemide 40mg POQD    Overall improving  Still needs diuresis to improve the BP
- Rate controlled @ 70-80 bpm  - home Eliquis 2.5 mg BID (last dose month ago) increased to eliquis 5 mg BID as d/w Dr. Blackman.
Improved BP. This is likely contributing to the improved renal perfusion and better r today    Continue with prescribed:  amLODIPine   Tablet 10 milliGRAM(s) daily  coreg 6.25mg POBID  lisinopril 40 milliGRAM(s) daily    Would suggest resuming his  Torsemide 40mg POQD    For sustained natriuresis throughout a 24 hour time period
- currently rate controlled 60s-80s  -c/w Eliquis 5mg BID as per Dr. Blackman
Improved BP. This is likely contributing to the improved renal perfusion and better r today    Continue with prescribed:  amLODIPine   Tablet 10 milliGRAM(s) daily  hydrALAZINE 25 milliGRAM(s) every 8 hours  lisinopril 40 milliGRAM(s) daily  torsemide 40 milliGRAM(s) daily

## 2018-04-17 NOTE — PROGRESS NOTE ADULT - PROBLEM SELECTOR PROBLEM 6
DM (diabetes mellitus)
DM (diabetes mellitus)
Chronic venous insufficiency
DM (diabetes mellitus)
Chronic venous insufficiency

## 2018-04-17 NOTE — DISCHARGE NOTE ADULT - CARE PROVIDERS DIRECT ADDRESSES
[email or web address]

## 2018-04-17 NOTE — PROGRESS NOTE ADULT - CONSTITUTIONAL DETAILS
no distress/obese/well-developed
well-nourished/well-developed/well-groomed/obese
well-developed/well-groomed/no distress/well-nourished/obese
well-nourished/well-developed/well-groomed/no distress

## 2018-04-17 NOTE — PROGRESS NOTE ADULT - RS GEN PE MLT RESP DETAILS PC
airway patent/no rales/normal/respirations non-labored/no rhonchi/no wheezes
airway patent/normal/no rales/clear to auscultation bilaterally/no rhonchi/no wheezes
no rales/no wheezes/no rhonchi/clear to auscultation bilaterally/normal
no rhonchi/no rales/airway patent/no wheezes

## 2018-04-17 NOTE — DISCHARGE NOTE ADULT - PATIENT PORTAL LINK FT
You can access the [facility], offered by [facility], by registering with the following website: [email or web address]

## 2018-04-17 NOTE — DISCHARGE NOTE ADULT - MEDICATION SUMMARY - MEDICATIONS TO STOP TAKING
I will STOP taking the medications listed below when I get home from the hospital:    atenolol 25 mg oral tablet  -- orally 2 times a day

## 2018-04-17 NOTE — PROGRESS NOTE ADULT - PROBLEM SELECTOR PROBLEM 1
Fluid overload
CKD (chronic kidney disease), stage IV
Fluid overload
CKD (chronic kidney disease), stage IV
CKD (chronic kidney disease), stage IV
Systolic CHF, acute
CKD (chronic kidney disease), stage IV

## 2018-04-17 NOTE — DISCHARGE NOTE ADULT - CARE PLAN
Principal Discharge DX:	Systolic CHF, acute  Goal:	You have a weak heart and need to take Torsemide 40mg daily, Metolazone 5mg daily(prior to Torsemide dose), Aldactone 25mg once a day.  Assessment and plan of treatment:	You have a history of heart failure and should continue your daily lasix, aldactone, metolazone. You should weigh yourself daily and if you notice a weight gain of more than 2-3 pounds in 2 days, shortness of breath, or lower extremity swelling you should contact your doctor immediately. Please restrict your fluid intake to 1-2L daily. Follow up with Dr. Martinez on April 27th, 2018 at 2pm.  Secondary Diagnosis:	Afib  Goal:	You have an irregular heart rhythm and need to continue Eliquis 5mg every 12 hours for stroke prevention  Assessment and plan of treatment:	Follow up with Dr. Martinez on April 27th, 2018 at 2pm.  Secondary Diagnosis:	Lymphedema of both lower extremities  Goal:	Continue with ACE bandage wraps daily as well as lymphedema pump to help decrease swelling.  Assessment and plan of treatment:	Follow up with Dr. Bhagat in 1-2 weeks.  Secondary Diagnosis:	HTN (hypertension)  Goal:	Continue Amlodipine 10mg at bedtime, Carvedilol 12.5mg every 12 hours, Hydralazine 50mg every 12 hours, Lisinopril 40mg once a day  Assessment and plan of treatment:	Follow up with Dr. Martinez on April 27th, 2018 at 2pm.  Secondary Diagnosis:	CKD (chronic kidney disease), stage IV  Goal:	Have your kidney function checked weekly and follow up with Dr. Kelly in 1 week.  Secondary Diagnosis:	DM (diabetes mellitus)  Goal:	Continue home Glipizide 5mg daily and check fingersticks regularly  Secondary Diagnosis:	BPH (benign prostatic hyperplasia)  Goal:	Continue your home Doxazosin 2mg once a day  Assessment and plan of treatment:	Also continue your home glaucoma drops b/l as prescribed.

## 2018-04-17 NOTE — DISCHARGE NOTE ADULT - HOSPITAL COURSE
68 y/o M NON- COMPLIANT WITH MEDS/POOR MEDICAL F/U with PMH of  afib (on eliquis; last dose a month ago) HTN, DM with peripheral nephropathy, CKD stage 4 ( Cr 2.31 today; baseline cr 1.7), PAD, chronic venous insufficiency, chronic cellulitis , chronic B/L LE edema,  + MRSA right foot ulcer(June 2017; tx with antibiotics), BPH, poor vision in B/L eyes 2/2 glaucoma  who is now admitted to 5 uris for further management of B/L LE edema and BP management.Of note: f/u with Dr. Ford who he hasn’t seen for a year; as per Dr. Liu; EF normal in echo 2016.  he was supposed to f/u vascular which he never did. Pt diuresed well on IV LAsix transitioned to Torsemide 40mg daily. Echo 4/13/18 showed LVEF 40-45%, mod MR, mod TR and mod Pulm HTN. Pt was seen by renal Dr. Kelly who knows pt as o/p and started pt on Metolazone 5mg daily on 4/16/18 and s/p first dose 1 hour later had episode of confusion which he underwent CT head non-con showing mod small vessel disease unchanged from prior CT in 6/2017 and negative for acute pathology. As d/w renal and Dr. Blackman pt to go home on the Torsemide 40mg once daily as well as Metolazone 5mg daily(to be given 30 minutes prior to Torsemide) and addition of Aldactone 25mg daily. Dr. Kelly to see pt at [facility] weekly. Vascular also saw pt for chronic venous insufficiency and LE duplex negative for DVT noted to have chronic lymphedema recommended to have ACE bandages to b/l LE and elevation above heart and to f/u with Dr. Bhagat in 1-2 weeks. Pt also recommended for lymphedema pump daily to reduce edema which confirmed with [facility] that they have and can provide. Pt stable for d/c to [facility] as d/w Dr. Blackman and o/p f/u with Dr. Kelly who will follow pt at [facility] rehab and Dr. Johnson(cardiologist) appt on 4/27/18 at 2pm and Dr. Bhagat in 1-2 weeks.

## 2018-04-17 NOTE — PROGRESS NOTE ADULT - PROBLEM SELECTOR PLAN 5
- Cr 2.31 on admission; baseline Cr 1.7, Cr stable 2.40 from 2.21 yesterday  - Renal service following(Dr. Pineda) added Metolazone 5mg daily today  - Renal U/S ordered to r/o GUY/Renal Vein thrombosis  - Urine Total Protein high at 148 otherwise urine studies unremarkable
Low sodium low phosphorus diet  follow up PTH, 25VIt D and phosphorus.
- Cr 2.31 on admission; baseline Cr 1.7, Cr 2.10 today  - Renal consulted, Dr. Rizzo following and thinks that  is clear that the patient is likely having progression of disease from his (presumed) DM nephropathy and possibly superimposed secondary FSGS due to obesity. The leg edema appears as lymphedema and he is not having anasarca, pulmonary edema, and has no urinalysis/hypoalbuminemia evidence of acute GN right now.   - Ignacio 101 UK 34 UCr 43 UCl 114  UUA 11.4 UOsm 398 /43  - Renal U/S and with Dopplers to rule out renal artery stenosis and renal vein thrombosis ordered. f/u   - f/u PTH and 25Vitamin D, 24 hr urine protein    Hg/HCt 9.6/29.9; Hg/Hct 9.9/29.1 on admission  - pt. denies any bleeding, GI bleeding, hematemesis, hematuria, BRBPR or melena .   - iron studies ordered, f/u results.
- Cr 2.31 on admission; baseline Cr 1.7, Cr 2.10--->2.21 today   - Renal consulted, Dr. Rizzo following and thinks that  is clear that the patient is likely having progression of disease from his (presumed) DM nephropathy and possibly superimposed secondary FSGS due to obesity. The leg edema appears as lymphedema and he is not having anasarca, pulmonary edema, and has no urinalysis/hypoalbuminemia evidence of acute GN right now.   - Ignacio 101 UK 34 UCr 43 UCl 114  UUA 11.4 UOsm 398 /43  - Renal U/S and with Dopplers to rule out renal artery stenosis and renal vein thrombosis ordered. f/u   - f/u PTH and 25Vitamin D, 24 hr urine protein  -Hg/HCt 9.6/29.9; Hg/Hct 9.9/29.1 on admission  - pt. denies any bleeding, GI bleeding, hematemesis, hematuria, BRBPR or melena .   - iron studies ordered, f/u results.
Low sodium low phosphorus diet  As above in Box 3
Low sodium low phosphorus diet  follow up PTH, 25VIt D and phosphorus.
Low sodium low phosphorus diet  As above in Box 3

## 2018-04-17 NOTE — DISCHARGE NOTE ADULT - CONDITIONS AT DISCHARGE
[facility]  [address] [address]  [phone]    Janki Hall LCSW

## 2018-04-17 NOTE — DISCHARGE NOTE ADULT - SECONDARY DIAGNOSIS.
Afib Lymphedema of both lower extremities HTN (hypertension) CKD (chronic kidney disease), stage IV DM (diabetes mellitus) BPH (benign prostatic hyperplasia)

## 2018-04-17 NOTE — PROGRESS NOTE ADULT - SUBJECTIVE AND OBJECTIVE BOX
Patient seen and examined at bedside.     Chief Complaint: Leg swelling, dyspnea, CKD    The patient returned to the floor after going for CT scan of the head.   The primary team informs me was transiently confused after receiving his dose of metolazone 5mg yesterday. No hypotension noted.  This was the reason for the discontinuation of the metolazone  However, the patient as returned to his mental status baseline and has had a negative CT Head for acute pathology.    The team informs me he will be discharged to [facility]     The patient reports no dyspnea and ongoing leg swelling.       amLODIPine   Tablet 10 milliGRAM(s) at bedtime  apixaban 5 milliGRAM(s) every 12 hours  benzocaine 15 mG/menthol 3.6 mG Lozenge 1 Lozenge daily PRN  brimonidine 0.2% Ophthalmic Solution 1 Drop(s) two times a day  carvedilol 12.5 milliGRAM(s) every 12 hours  clotrimazole 1% Cream 1 Application(s) two times a day  cyanocobalamin 1000 MICROGram(s) daily  dextrose 5%. 1000 milliLiter(s) <Continuous>  dextrose 50% Injectable 12.5 Gram(s) once  dextrose 50% Injectable 25 Gram(s) once  dextrose 50% Injectable 25 Gram(s) once  dextrose Gel 1 Dose(s) once PRN  dorzolamide 2% Ophthalmic Solution 1 Drop(s) three times a day  doxazosin 2 milliGRAM(s) daily  glucagon  Injectable 1 milliGRAM(s) once PRN  hydrALAZINE 50 milliGRAM(s) two times a day  insulin lispro (HumaLOG) corrective regimen sliding scale   Before meals and at bedtime  lisinopril 40 milliGRAM(s) daily  nystatin Cream 1 Application(s) two times a day  potassium chloride  10 mEq/100 mL IVPB 10 milliEquivalent(s) every 1 hour  sodium chloride 0.65% Nasal 1 Spray(s) daily PRN  timolol 0.5% Solution 1 Drop(s) two times a day  torsemide 40 milliGRAM(s) daily      Allergies    tetanus-diphth toxoids (Td) adult/adol (Unknown)    Intolerances        T(C): , Max: 36.4 (04-17-18 @ 00:34)  T(F): , Max: 97.5 (04-17-18 @ 00:34)  HR: 68 (04-17-18 @ 05:19)  BP: 184/83 (04-17-18 @ 05:19)  BP(mean): --  RR: 16 (04-17-18 @ 05:19)  SpO2: 93% (04-17-18 @ 05:19)  Wt(kg): --    04-16 @ 07:01  -  04-17 @ 07:00  --------------------------------------------------------  IN:    Oral Fluid: 300 mL  Total IN: 300 mL    OUT:    Voided: 200 mL  Total OUT: 200 mL    Total NET: 100 mL              LABS:                        9.3    6.4   )-----------( 145      ( 17 Apr 2018 06:40 )             30.2     04-17    139  |  100  |  37<H>  ----------------------------<  127<H>  3.1<L>   |  26  |  2.34<H>    Ca    8.3<L>      17 Apr 2018 06:38  Phos  4.2     04-16  Mg     2.0     04-17    TPro  6.6  /  Alb  3.4  /  TBili  0.7  /  DBili  x   /  AST  14  /  ALT  9<L>  /  AlkPhos  62  04-16                RADIOLOGY & ADDITIONAL STUDIES:

## 2018-04-17 NOTE — DISCHARGE NOTE ADULT - MEDICATION SUMMARY - MEDICATIONS TO TAKE
I will START or STAY ON the medications listed below when I get home from the hospital:    Aldactone 25 mg oral tablet  -- 1 tab(s) by mouth once a day  -- Indication: For Systolic CHF, acute    lisinopril 40 mg oral tablet  -- 1 tab(s) by mouth once a day  -- Indication: For HTN (hypertension)    doxazosin 2 mg oral tablet  -- 1 tab(s) by mouth once a day  -- Indication: For BPH (benign prostatic hyperplasia)    Eliquis 5 mg oral tablet  -- 1 tab(s) by mouth every 12 hours  -- Indication: For Afib    glipiZIDE  -- 5  by mouth once a day  -- Indication: For DM (diabetes mellitus)    carvedilol 12.5 mg oral tablet  -- 1 tab(s) by mouth every 12 hours  -- Indication: For HTN (hypertension)    amLODIPine 10 mg oral tablet  -- 1 tab(s) by mouth once a day (at bedtime)  -- Indication: For HTN (hypertension)    torsemide 20 mg oral tablet  -- 2 tab(s) by mouth once a day  -- Indication: For Systolic CHF, acute    metOLazone 5 mg oral tablet  -- 1 tab(s) by mouth once a day 30 minutes prior to Torsemide in AM  -- Indication: For Systolic CHF, acute    Combigan 0.2%-0.5% ophthalmic solution  -- 1 drop(s) to each affected eye every 12 hours  -- Indication: For Glaucoma    dorzolamide 2% ophthalmic solution  -- 1 drop(s) to each affected eye 3 times a day  -- Indication: For Glaucoma    hydrALAZINE 50 mg oral tablet  -- 1 tab(s) by mouth 2 times a day  -- Indication: For HTN (hypertension)    Vitamin B12 1000 mcg oral tablet  -- 1 tab(s) by mouth once a day  -- Indication: For Vitamin B supplement

## 2018-04-17 NOTE — PROGRESS NOTE ADULT - PROBLEM SELECTOR PLAN 1
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    Given his persistent hypervolemia, it may be beneficial to increase diuresis and allow for a higher Cr baseline if it allows his legs to be less edematous. Aware his primary issue is venous insufficiency and lymphedema perhaps. But with CKD and hypertension, ECV overload is a superimposed issue    Recommend   c/w Torsemide 40mg POQD  c/w  METOLAZONE 5mg POQD  Add Aldactone 25mg POQD     Discussed with DWAIN Gray who will relay this to his attending physician

## 2018-04-19 DIAGNOSIS — E11.319 TYPE 2 DIABETES MELLITUS WITH UNSPECIFIED DIABETIC RETINOPATHY WITHOUT MACULAR EDEMA: ICD-10-CM

## 2018-04-19 DIAGNOSIS — Z79.84 LONG TERM (CURRENT) USE OF ORAL HYPOGLYCEMIC DRUGS: ICD-10-CM

## 2018-04-19 DIAGNOSIS — N18.4 CHRONIC KIDNEY DISEASE, STAGE 4 (SEVERE): ICD-10-CM

## 2018-04-19 DIAGNOSIS — I89.0 LYMPHEDEMA, NOT ELSEWHERE CLASSIFIED: ICD-10-CM

## 2018-04-19 DIAGNOSIS — I27.20 PULMONARY HYPERTENSION, UNSPECIFIED: ICD-10-CM

## 2018-04-19 DIAGNOSIS — E66.9 OBESITY, UNSPECIFIED: ICD-10-CM

## 2018-04-19 DIAGNOSIS — I48.91 UNSPECIFIED ATRIAL FIBRILLATION: ICD-10-CM

## 2018-04-19 DIAGNOSIS — Z28.21 IMMUNIZATION NOT CARRIED OUT BECAUSE OF PATIENT REFUSAL: ICD-10-CM

## 2018-04-19 DIAGNOSIS — Z88.7 ALLERGY STATUS TO SERUM AND VACCINE: ICD-10-CM

## 2018-04-19 DIAGNOSIS — D63.1 ANEMIA IN CHRONIC KIDNEY DISEASE: ICD-10-CM

## 2018-04-19 DIAGNOSIS — N40.0 BENIGN PROSTATIC HYPERPLASIA WITHOUT LOWER URINARY TRACT SYMPTOMS: ICD-10-CM

## 2018-04-19 DIAGNOSIS — E83.9 DISORDER OF MINERAL METABOLISM, UNSPECIFIED: ICD-10-CM

## 2018-04-19 DIAGNOSIS — E11.51 TYPE 2 DIABETES MELLITUS WITH DIABETIC PERIPHERAL ANGIOPATHY WITHOUT GANGRENE: ICD-10-CM

## 2018-04-19 DIAGNOSIS — I50.21 ACUTE SYSTOLIC (CONGESTIVE) HEART FAILURE: ICD-10-CM

## 2018-04-19 DIAGNOSIS — E11.21 TYPE 2 DIABETES MELLITUS WITH DIABETIC NEPHROPATHY: ICD-10-CM

## 2018-04-19 DIAGNOSIS — Z91.14 PATIENT'S OTHER NONCOMPLIANCE WITH MEDICATION REGIMEN: ICD-10-CM

## 2018-04-19 DIAGNOSIS — E11.22 TYPE 2 DIABETES MELLITUS WITH DIABETIC CHRONIC KIDNEY DISEASE: ICD-10-CM

## 2018-04-19 DIAGNOSIS — I13.0 HYPERTENSIVE HEART AND CHRONIC KIDNEY DISEASE WITH HEART FAILURE AND STAGE 1 THROUGH STAGE 4 CHRONIC KIDNEY DISEASE, OR UNSPECIFIED CHRONIC KIDNEY DISEASE: ICD-10-CM

## 2018-04-19 DIAGNOSIS — H40.9 UNSPECIFIED GLAUCOMA: ICD-10-CM

## 2018-04-19 DIAGNOSIS — I87.2 VENOUS INSUFFICIENCY (CHRONIC) (PERIPHERAL): ICD-10-CM

## 2018-04-19 DIAGNOSIS — E87.8 OTHER DISORDERS OF ELECTROLYTE AND FLUID BALANCE, NOT ELSEWHERE CLASSIFIED: ICD-10-CM

## 2018-04-27 ENCOUNTER — APPOINTMENT (OUTPATIENT)
Dept: HEART AND VASCULAR | Facility: CLINIC | Age: 68
End: 2018-04-27

## 2018-04-30 ENCOUNTER — APPOINTMENT (OUTPATIENT)
Dept: VASCULAR SURGERY | Facility: CLINIC | Age: 68
End: 2018-04-30
Payer: MEDICARE

## 2018-04-30 PROCEDURE — 99213 OFFICE O/P EST LOW 20 MIN: CPT

## 2020-03-06 NOTE — DISCHARGE NOTE ADULT - PROVIDER RX CONTACT NUMBER
Sex allowed/Return to Work/School allowed/Walking - Indoors allowed/Driving allowed/Showering allowed/Walking - Outdoors allowed/No heavy lifting/straining
[phone]

## 2021-04-03 NOTE — PROGRESS NOTE ADULT - PROBLEM SELECTOR PROBLEM 8
BPH (benign prostatic hyperplasia)
admission

## 2021-05-30 NOTE — PATIENT PROFILE ADULT. - ARE SIGNIFICANT INDICATORS COMPLETE.
Patient Returning Call  Reason for call:  The patient is returning the call.  Information relayed to patient:  Below message has been relayed to the patient. The patient was transferred to appointment scheduling.   Patient has additional questions:  No  If YES, what are your questions/concerns:  NA  Okay to leave a detailed message?: No   Yes

## 2023-03-27 NOTE — PHYSICAL THERAPY INITIAL EVALUATION ADULT - GENERAL OBSERVATIONS, REHAB EVAL
Get your Prescription filled at [facility]!    [facility] uses the same medical record your doctor uses. More accurate and timely information for your doctor and your pharmacy means more effective and safer health care for you and your family.  [facility] accepts all of the major insurance plans which means you pay the same copay wherever you go.  [facility] Pharmacists take the time to explain your medication regimen to you.  [facility] will mail your medications to you free of postage and handling charges. We love [facility].       At [facility], one important tool we use to improve our patient services is our Patient Survey.  Following your visit you may receive our survey in the mail.    Please take the time to complete the survey.    If your visit with us was great, we want to hear about it.    If we can improve, please let us know how.         
Patient received in semi-garevy position, no apparent distress, +telemetry, ace wrapped lymphedematous b/l LE tourniquet at the knees (vascular made aware and unwrapped together), +hep lock.

## 2023-07-13 NOTE — PROGRESS NOTE ADULT - PROBLEM SELECTOR PLAN 7
Pt remains in paper scrubs, resting in stretcher comfortably. No signs of distress noted. Sitter remains at bedside in direct visual contact, charting per protocol every 15 minutes. No equipment or belongings are in the patients room. Pt aware of plan of care. Will continue to monitor.     Creatinine stable in 1.5

## 2024-01-25 NOTE — CONSULT NOTE ADULT - SKIN
Problem: Patient Care Overview (Adult)  Goal: Plan of Care Review  Outcome: Ongoing (interventions implemented as appropriate)    07/03/17 0442   Coping/Psychosocial Response Interventions   Plan Of Care Reviewed With patient   Patient Care Overview   Progress no change   Outcome Evaluation   Outcome Summary/Follow up Plan Patients vitals stable. Patient reports pain this shift but request no PRN medication. Patient still on fluid restriction and tolerating it well. Will continue to monitor.         Problem: Fall Risk (Adult)  Goal: Absence of Falls  Outcome: Ongoing (interventions implemented as appropriate)    Problem: Pain, Chronic (Adult)  Goal: Acceptable Pain Control/Comfort Level  Outcome: Ongoing (interventions implemented as appropriate)       No detailed exam color normal/warm and dry